# Patient Record
Sex: FEMALE | Race: WHITE | NOT HISPANIC OR LATINO | Employment: FULL TIME | URBAN - METROPOLITAN AREA
[De-identification: names, ages, dates, MRNs, and addresses within clinical notes are randomized per-mention and may not be internally consistent; named-entity substitution may affect disease eponyms.]

---

## 2017-01-03 ENCOUNTER — HOSPITAL ENCOUNTER (OUTPATIENT)
Dept: LAB | Facility: MEDICAL CENTER | Age: 47
End: 2017-01-03
Attending: STUDENT IN AN ORGANIZED HEALTH CARE EDUCATION/TRAINING PROGRAM
Payer: COMMERCIAL

## 2017-01-03 DIAGNOSIS — E03.9 HYPOTHYROIDISM (ACQUIRED): ICD-10-CM

## 2017-01-03 LAB
T4 FREE SERPL-MCNC: 0.87 NG/DL (ref 0.53–1.43)
TSH SERPL DL<=0.005 MIU/L-ACNC: 0.77 UIU/ML (ref 0.3–3.7)

## 2017-01-03 PROCEDURE — 36415 COLL VENOUS BLD VENIPUNCTURE: CPT

## 2017-01-03 PROCEDURE — 84443 ASSAY THYROID STIM HORMONE: CPT

## 2017-01-03 PROCEDURE — 84439 ASSAY OF FREE THYROXINE: CPT

## 2017-01-04 ENCOUNTER — OFFICE VISIT (OUTPATIENT)
Dept: INTERNAL MEDICINE | Facility: MEDICAL CENTER | Age: 47
End: 2017-01-04
Payer: COMMERCIAL

## 2017-01-04 VITALS
TEMPERATURE: 97.3 F | RESPIRATION RATE: 16 BRPM | DIASTOLIC BLOOD PRESSURE: 66 MMHG | SYSTOLIC BLOOD PRESSURE: 102 MMHG | BODY MASS INDEX: 29.66 KG/M2 | OXYGEN SATURATION: 97 % | HEART RATE: 78 BPM | HEIGHT: 63 IN | WEIGHT: 167.38 LBS

## 2017-01-04 DIAGNOSIS — K21.9 GERD WITHOUT ESOPHAGITIS: ICD-10-CM

## 2017-01-04 DIAGNOSIS — E03.9 HYPOTHYROIDISM (ACQUIRED): ICD-10-CM

## 2017-01-04 DIAGNOSIS — E55.9 VITAMIN D DEFICIENCY: ICD-10-CM

## 2017-01-04 PROCEDURE — 99213 OFFICE O/P EST LOW 20 MIN: CPT | Mod: GE | Performed by: INTERNAL MEDICINE

## 2017-01-04 RX ORDER — RANITIDINE 150 MG/1
150 TABLET ORAL 2 TIMES DAILY
Qty: 60 TAB | Refills: 3 | Status: SHIPPED | OUTPATIENT
Start: 2017-01-04 | End: 2017-01-04

## 2017-01-04 RX ORDER — PANTOPRAZOLE SODIUM 20 MG/1
20 TABLET, DELAYED RELEASE ORAL DAILY
Qty: 60 TAB | Refills: 2 | Status: SHIPPED | OUTPATIENT
Start: 2017-01-04 | End: 2017-03-16 | Stop reason: SDUPTHER

## 2017-01-04 RX ORDER — ERGOCALCIFEROL 1.25 MG/1
50000 CAPSULE ORAL
Qty: 12 CAP | Refills: 0 | Status: SHIPPED | OUTPATIENT
Start: 2017-01-04 | End: 2018-02-07

## 2017-01-04 NOTE — MR AVS SNAPSHOT
"        Shelby De Paz   2017 3:00 PM   Office Visit   MRN: [id number]    Department:  [facility] Med - Internal Med   Dept Phone:  [phone]    Description:  Female : 1970   Provider:  Wayne Arroyo M.D.           Reason for Visit     Labs Only Results      Allergies as of 2017     Allergen Noted Reactions    Contrast Media With Iodine [Iodine] 2016       itches      You were diagnosed with     GERD without esophagitis   [398608]       Hypothyroidism (acquired)   [238765]       Vitamin D deficiency   [0967367]         Vital Signs     Blood Pressure Pulse Temperature Respirations Height Weight    102/66 mmHg 78 36.3 °C (97.3 °F) 16 1.588 m (5' 2.52\") 75.921 kg (167 lb 6 oz)    Body Mass Index Oxygen Saturation Smoking Status             30.11 kg/m2 97% Former Smoker         Basic Information     Date Of Birth Sex Race Ethnicity Preferred Language    1970 Female White Non- English      Your appointments     Mar 16, 2017 11:00 AM   Established Patient with Tabitha Phoenix M.D.   [facility] - Internal Medicine (--)    [address] [place] [id number] [phone]           You will be receiving a confirmation call a few days before your appointment from our automated call confirmation system.              Problem List              ICD-10-CM Priority Class Noted - Resolved    Female incontinence R32   2016 - Present    GERD without esophagitis K21.9   2016 - Present    Primary insomnia F51.01   2016 - Present    Hyperlipidemia, unspecified E78.5   2016 - Present    Vitamin D deficiency E55.9   2016 - Present    Dependence on nicotine from cigarettes F17.210   2016 - Present    Mixed incontinence urge and stress N39.46   2016 - Present    Hypothyroidism (acquired) E03.9   2016 - Present    Healthcare maintenance Z00.00   2016 - Present      Health Maintenance        Date Due Completion Dates "    IMM DTaP/Tdap/Td Vaccine (2 - Tdap) 8/21/1991 1/11/1977    IMM PNEUMOCOCCAL 19-64 (ADULT) MEDIUM RISK SERIES (1 of 1 - PPSV23) 8/21/1991 ---    PAP SMEAR 8/21/1993 ---    MAMMOGRAM 8/21/2012 ---    IMM INFLUENZA (1) 9/1/2016 12/14/2011            Current Immunizations     Dtap Vaccine 1/11/1977    Influenza Vaccine Quad Inj (Pf) 12/14/2011    OPV - Historical Data 1/11/1977      Below and/or attached are the medications your provider expects you to take. Review all of your home medications and newly ordered medications with your provider and/or pharmacist. Follow medication instructions as directed by your provider and/or pharmacist. Please keep your medication list with you and share with your provider. Update the information when medications are discontinued, doses are changed, or new medications (including over-the-counter products) are added; and carry medication information at all times in the event of emergency situations     Allergies:  CONTRAST MEDIA WITH IODINE - (reactions not documented)               Medications  Valid as of: January 04, 2017 -  3:38 PM    Generic Name Brand Name Tablet Size Instructions for use    Acetaminophen (Tab) TYLENOL 500 MG Take 500-1,000 mg by mouth every 6 hours as needed.        Ergocalciferol (Cap) DRISDOL 33892 UNITS Take 1 Cap by mouth every 7 days.        Levothyroxine Sodium (Tab) SYNTHROID 150 MCG Take 1 Tab by mouth Every morning on an empty stomach.        Pantoprazole Sodium (Tablet Delayed Response) PROTONIX 20 MG Take 1 Tab by mouth every day.        Simvastatin (Tab) ZOCOR 40 MG Take 40 mg by mouth every evening.        .                 Medicines prescribed today were sent to:     [id number] - [place] - [address]    [address] [place] [address]    Phone: [phone] Fax: [phone]    Open 24 Hours?: No      Medication refill instructions:       If your prescription bottle indicates you have medication refills left, it is not  necessary to call your provider’s office. Please contact your pharmacy and they will refill your medication.    If your prescription bottle indicates you do not have any refills left, you may request refills at any time through one of the following ways: The online [facility] system (except Urgent Care), by calling your provider’s office, or by asking your pharmacy to contact your provider’s office with a refill request. Medication refills are processed only during regular business hours and may not be available until the next business day. Your provider may request additional information or to have a follow-up visit with you prior to refilling your medication.   *Please Note: Medication refills are assigned a new Rx number when refilled electronically. Your pharmacy may indicate that no refills were authorized even though a new prescription for the same medication is available at the pharmacy. Please request the medicine by name with the pharmacy before contacting your provider for a refill.        Instructions    Return in about 10 weeks (around 3/15/2017).  Increase PPI to 2x daily. If not improvement after 2 weeks, go ahead and scheduled consultation with GI consultants.   If symptoms are stable and well controlled after increasing PPI, then continue BID therapy for 10 weeks (Until next follow up).     Food Choices for Gastroesophageal Reflux Disease, Adult  When you have gastroesophageal reflux disease (GERD), the foods you eat and your eating habits are very important. Choosing the right foods can help ease the discomfort of GERD.  WHAT GENERAL GUIDELINES DO I NEED TO FOLLOW?  · Choose fruits, vegetables, whole grains, low-fat dairy products, and low-fat meat, fish, and poultry.  · Limit fats such as oils, salad dressings, butter, nuts, and avocado.  · Keep a food diary to identify foods that cause symptoms.  · Avoid foods that cause reflux. These may be different for different people.  · Eat frequent small meals  instead of three large meals each day.  · Eat your meals slowly, in a relaxed setting.  · Limit fried foods.  · Cook foods using methods other than frying.  · Avoid drinking alcohol.  · Avoid drinking large amounts of liquids with your meals.  · Avoid bending over or lying down until 2-3 hours after eating.  WHAT FOODS ARE NOT RECOMMENDED?  The following are some foods and drinks that may worsen your symptoms:  Vegetables  Tomatoes. Tomato juice. Tomato and spaghetti sauce. Chili peppers. Onion and garlic. Horseradish.  Fruits  Oranges, grapefruit, and lemon (fruit and juice).  Meats  High-fat meats, fish, and poultry. This includes hot dogs, ribs, ham, sausage, salami, and brownlee.  Dairy  Whole milk and chocolate milk. Sour cream. Cream. Butter. Ice cream. Cream cheese.   Beverages  Coffee and tea, with or without caffeine. Carbonated beverages or energy drinks.  Condiments  Hot sauce. Barbecue sauce.   Sweets/Desserts  Chocolate and cocoa. Donuts. Peppermint and spearmint.  Fats and Oils  High-fat foods, including French fries and potato chips.  Other  Vinegar. Strong spices, such as black pepper, white pepper, red pepper, cayenne, wilkinson powder, cloves, ginger, and chili powder.  The items listed above may not be a complete list of foods and beverages to avoid. Contact your dietitian for more information.     This information is not intended to replace advice given to you by your health care provider. Make sure you discuss any questions you have with your health care provider.     Document Released: 12/18/2006 Document Revised: 01/08/2016 Document Reviewed: 10/22/2014  [employer] Interactive Patient Education ©2016 Elsevier Inc.            [employer] Access Code: Activation code not generated  Current [employer] Status: Active

## 2017-01-04 NOTE — PROGRESS NOTES
Established Patient    Shelby presents today with the following:    CC:   Chief Complaint   Patient presents with   • Labs Only     Results       HPI:  Ms. De Paz is a 46-year-old female with past medical history significant for hypothyroidism and GERD is here for a follow up visit.      Acute Issues:   1. Hypothyroidism -  Patient's thyroid replacement was changed from 175 mcg to 150mcg at the last visit. Repeat thyroid labs were completed yesterday showing TSH and free T4 well within normal limits. Her weight has since stabilized. Denies any significant changes in energy levels or concentration, denies constipation or diarrhea, denies hot or cold intolerance.    2. Lower extremity swelling  Review of labs shows normal kidney and liver function. The patient denies any dyspnea on exertion, orthopnea, PND. Her weight has not significantly changed since her last appointment (167lb). She has no significant edema on exam today but reports having some lower extremity swelling at the end of the day. She was advised to use compression stockings at the last visit but has yet to use them.      3. GERD  Patient was recently seen in urgent care for abdominal pain which has since resolved. Ultrasound and lab findings from that visit were reviewed showing unremarkable abdominal ultrasound and normal CBC, amylase, lipase and CMP. The pain that prompted urgent care visit since resolved but she continues to have heartburn. Her heartburn is triggered by several foods including tomato sauces, spicy foods and even water. She reports occasional vomiting but denies weight loss, odynophagia, dysphagia, lymphadenopathy, changes in the color of stools (recent CBC showed normal hemoglobin). She is currently taking pantoprazole 20 mg daily. Her symptoms are well-controlled with once a day PPI until about a month ago.      Patient Active Problem List    Diagnosis Date Noted   • GERD without esophagitis 11/22/2016   • Primary insomnia  "11/22/2016   • Hyperlipidemia, unspecified 11/22/2016   • Vitamin D deficiency 11/22/2016   • Dependence on nicotine from cigarettes 11/22/2016   • Mixed incontinence urge and stress 11/22/2016   • Hypothyroidism (acquired) 11/22/2016   • Healthcare maintenance 11/22/2016   • Female incontinence 08/19/2016       Current Outpatient Prescriptions   Medication Sig Dispense Refill   • vitamin D, Ergocalciferol, (DRISDOL) 98581 UNITS Cap capsule Take 1 Cap by mouth every 7 days. 12 Cap 0   • pantoprazole (PROTONIX) 20 MG tablet Take 1 Tab by mouth every day. 60 Tab 2   • levothyroxine (SYNTHROID) 150 MCG Tab Take 1 Tab by mouth Every morning on an empty stomach. 30 Tab 2   • acetaminophen (TYLENOL) 500 MG Tab Take 500-1,000 mg by mouth every 6 hours as needed.     • simvastatin (ZOCOR) 40 MG Tab Take 40 mg by mouth every evening.       No current facility-administered medications for this visit.       Allergies:  Allergies   Allergen Reactions   • Contrast Media With Iodine [Iodine]      itches       ROS  Constitutional: Denies fevers or chills  Ears/Nose/Throat/Mouth: Denies nasal congestion or sore throat   Cardiovascular: Denies chest pain or palpitations   Respiratory: Denies shortness of breath, Denies cough  Gastrointestinal/Hepatic: As per history of present illness  Genitourinary: Denies dysuria or frequency  Musculoskeletal/Rheum: Denies joint pain and swelling   Neurological: Denies headache  Psychiatric: Denies mood disorder   Endocrine: History of low thyroid  Heme/Oncology/Lymph Nodes: Denies weight changes or enlarged LNs.    /66 mmHg  Pulse 78  Temp(Src) 36.3 °C (97.3 °F)  Resp 16  Ht 1.588 m (5' 2.52\")  Wt 75.921 kg (167 lb 6 oz)  BMI 30.11 kg/m2  SpO2 97%    Physical Exam  General: non-toxic apeparnce. NAD.   HEENT: EOMI. Scleral clear. No cervical lymphadenopathy  CVS: normal S1, S2. NSR. No m/r/g. No JVD.   PULM: CTABL . No w/r/r. No basilar crackles.   ABD: soft, NT, ND. +BS.   : No " suprapubic tenderness. No CVA tenderness.   EXT: no LE edema b/l. No cyanosis.  Neuro: No focal deficits.   Pysch: AAOx4  Skin: no rashes.     Assessment and Plan  1. GERD without esophagitis  Patient has symptoms of dyspepsia with no real red flag symptoms at this time that prompted emergent EGD (does have intermittent vomiting).  Will increase pantoprazole to twice a day and treat empirically for functional GERD.   She was educated on avoiding NSAIDs, smoking, alcohol and triggering foods (avoid coffee, tea, chocolates, spicy foods, acidic foods, etc.)  Advised to sit upright for at least 2 hours after each meal and no food at least 2 hours before bed.  If the patient's symptoms get worse after 2 weeks of twice a day PPI, she was advised to make an appointment with GI (already has a referral).  - pantoprazole (PROTONIX) 20 MG tablet; Take 1 Tab by mouth every day.  Dispense: 60 Tab; Refill: 2    2. Hypothyroidism (acquired)  TSH and free T4 within normal limits. Patient with no symptoms of hyper or hypothyroidism at present.  Continue levothyroxine at 150 mcg daily.     3. Vitamin D deficiency  Vitamin D 22. Provided by mouth weekly replacement ×3 months.  - vitamin D, Ergocalciferol, (DRISDOL) 09656 UNITS Cap capsule; Take 1 Cap by mouth every 7 days.  Dispense: 12 Cap; Refill: 0    Follow-up:Return in about 10 weeks (around 3/15/2017).    Signed by: Wayne Arroyo M.D.

## 2017-01-04 NOTE — PATIENT INSTRUCTIONS
Return in about 10 weeks (around 3/15/2017).  Increase PPI to 2x daily. If not improvement after 2 weeks, go ahead and scheduled consultation with GI consultants.   If symptoms are stable and well controlled after increasing PPI, then continue BID therapy for 10 weeks (Until next follow up).     Food Choices for Gastroesophageal Reflux Disease, Adult  When you have gastroesophageal reflux disease (GERD), the foods you eat and your eating habits are very important. Choosing the right foods can help ease the discomfort of GERD.  WHAT GENERAL GUIDELINES DO I NEED TO FOLLOW?  · Choose fruits, vegetables, whole grains, low-fat dairy products, and low-fat meat, fish, and poultry.  · Limit fats such as oils, salad dressings, butter, nuts, and avocado.  · Keep a food diary to identify foods that cause symptoms.  · Avoid foods that cause reflux. These may be different for different people.  · Eat frequent small meals instead of three large meals each day.  · Eat your meals slowly, in a relaxed setting.  · Limit fried foods.  · Cook foods using methods other than frying.  · Avoid drinking alcohol.  · Avoid drinking large amounts of liquids with your meals.  · Avoid bending over or lying down until 2-3 hours after eating.  WHAT FOODS ARE NOT RECOMMENDED?  The following are some foods and drinks that may worsen your symptoms:  Vegetables  Tomatoes. Tomato juice. Tomato and spaghetti sauce. Chili peppers. Onion and garlic. Horseradish.  Fruits  Oranges, grapefruit, and lemon (fruit and juice).  Meats  High-fat meats, fish, and poultry. This includes hot dogs, ribs, ham, sausage, salami, and brownlee.  Dairy  Whole milk and chocolate milk. Sour cream. Cream. Butter. Ice cream. Cream cheese.   Beverages  Coffee and tea, with or without caffeine. Carbonated beverages or energy drinks.  Condiments  Hot sauce. Barbecue sauce.   Sweets/Desserts  Chocolate and cocoa. Donuts. Peppermint and spearmint.  Fats and Oils  High-fat foods,  including French fries and potato chips.  Other  Vinegar. Strong spices, such as black pepper, white pepper, red pepper, cayenne, wilkinson powder, cloves, ginger, and chili powder.  The items listed above may not be a complete list of foods and beverages to avoid. Contact your dietitian for more information.     This information is not intended to replace advice given to you by your health care provider. Make sure you discuss any questions you have with your health care provider.     Document Released: 12/18/2006 Document Revised: 01/08/2016 Document Reviewed: 10/22/2014  [employer] Interactive Patient Education ©2016 Elsevier Inc.

## 2017-03-14 ENCOUNTER — TELEPHONE (OUTPATIENT)
Dept: INTERNAL MEDICINE | Facility: MEDICAL CENTER | Age: 47
End: 2017-03-14

## 2017-03-14 NOTE — TELEPHONE ENCOUNTER
----- Message from Kierra Pantoja sent at 3/13/2017  1:45 PM PDT -----  Regarding: Dr Phoenix/labs for thyroid  Contact: [phone]  Patient is seeing Dr Arroyo this Thursday for follow up on her thyroid medication she would like to know if she needs lab drawn. I saw no orders in her chart patient goes to [facility] if we can call her once lab orders are put in.

## 2017-03-16 ENCOUNTER — OFFICE VISIT (OUTPATIENT)
Dept: INTERNAL MEDICINE | Facility: MEDICAL CENTER | Age: 47
End: 2017-03-16
Payer: COMMERCIAL

## 2017-03-16 ENCOUNTER — APPOINTMENT (OUTPATIENT)
Dept: INTERNAL MEDICINE | Facility: MEDICAL CENTER | Age: 47
End: 2017-03-16
Payer: COMMERCIAL

## 2017-03-16 VITALS
DIASTOLIC BLOOD PRESSURE: 74 MMHG | SYSTOLIC BLOOD PRESSURE: 100 MMHG | TEMPERATURE: 98.2 F | BODY MASS INDEX: 29.95 KG/M2 | WEIGHT: 169 LBS | HEART RATE: 74 BPM | HEIGHT: 63 IN | OXYGEN SATURATION: 92 %

## 2017-03-16 DIAGNOSIS — K21.9 GERD WITHOUT ESOPHAGITIS: ICD-10-CM

## 2017-03-16 DIAGNOSIS — E55.9 VITAMIN D DEFICIENCY: ICD-10-CM

## 2017-03-16 DIAGNOSIS — E03.9 HYPOTHYROIDISM (ACQUIRED): ICD-10-CM

## 2017-03-16 PROCEDURE — 99213 OFFICE O/P EST LOW 20 MIN: CPT | Mod: GE | Performed by: INTERNAL MEDICINE

## 2017-03-16 RX ORDER — PANTOPRAZOLE SODIUM 40 MG/1
40 TABLET, DELAYED RELEASE ORAL 2 TIMES DAILY
Qty: 60 TAB | Refills: 1 | Status: SHIPPED | OUTPATIENT
Start: 2017-03-16 | End: 2018-02-07

## 2017-03-16 NOTE — PROGRESS NOTES
Established Patient    Shelby presents today with the following:    CC:   Chief Complaint   Patient presents with   • Follow-Up     follow up visit       HPI:     Acute Issues:   1. Hypothyroidism -  The patient's thyroid dose was changed from 175mcg->150mcg a few months back. Since then she reports feeling slightly sluggish but otherwise denies any symptoms of hypothyroidism. She was concerned about possible weight gain however review of weight from last visit and this visit suggest no significant changes.    2. Lower extremity swelling  The patient reports that her lower extremity swelling is now resolved.    3. GERD  The patient continues to have heartburn described as burning sensation in the throat and stomach with occasional vomiting. She denies hematemesis, odynophagia, dysphagia, dark-colored stools, bright red blood per rectum or any significant changes in weight. Of note: review of old note from 2015 shows a weight of 150, whereas today the patient weighs 169lbs. She was placed on pantoprazole a few months back which has slowly been tapered to 20 mg twice a day. On this dose the patient reports that her symptoms are better controlled but continue to occur almost daily. She rarely uses alcohol, never smoker and rarely uses NSAIDs.      Patient Active Problem List    Diagnosis Date Noted   • GERD without esophagitis 11/22/2016   • Primary insomnia 11/22/2016   • Hyperlipidemia, unspecified 11/22/2016   • Vitamin D deficiency 11/22/2016   • Dependence on nicotine from cigarettes 11/22/2016   • Mixed incontinence urge and stress 11/22/2016   • Hypothyroidism (acquired) 11/22/2016   • Healthcare maintenance 11/22/2016   • Female incontinence 08/19/2016       Current Outpatient Prescriptions   Medication Sig Dispense Refill   • pantoprazole (PROTONIX) 40 MG Tablet Delayed Response Take 1 Tab by mouth 2 times a day. 60 Tab 1   • vitamin D, Ergocalciferol, (DRISDOL) 02254 UNITS Cap capsule Take 1 Cap by mouth  "every 7 days. 12 Cap 0   • simvastatin (ZOCOR) 40 MG Tab Take 40 mg by mouth every evening.     • levothyroxine (SYNTHROID) 150 MCG Tab Take 1 Tab by mouth Every morning on an empty stomach. 30 Tab 2   • acetaminophen (TYLENOL) 500 MG Tab Take 500-1,000 mg by mouth every 6 hours as needed.       No current facility-administered medications for this visit.       Allergies:  Allergies   Allergen Reactions   • Contrast Media With Iodine [Iodine]      itches       ROS  Constitutional: Denies fevers or chills  Ears/Nose/Throat/Mouth: Denies nasal congestion or sore throat   Cardiovascular: Denies chest pain or palpitations   Respiratory: Denies shortness of breath, Denies cough  Gastrointestinal/Hepatic: As discussed above  Neurological: Denies headache  Endocrine: History of low thyroid  Heme/Oncology/Lymph Nodes: Denies weight changes or enlarged LNs.    /74 mmHg  Pulse 74  Temp(Src) 36.8 °C (98.2 °F)  Ht 1.588 m (5' 2.52\")  Wt 76.658 kg (169 lb)  BMI 30.40 kg/m2  SpO2 92%    Physical Exam  General: non-toxic apeparnce. NAD.   HEENT: EOMI. Scleral clear.  CVS: normal S1, S2. NSR. No m/r/g.   PULM: CTABL . No w/r/r. No basilar crackles.   ABD: soft, NT, ND. +BS.   EXT: no LE edema b/l. No cyanosis.  Neuro: No focal deficits.   Pysch: AAOx4  Skin: no rashes.     Assessment and Plan  1. Vitamin D deficiency  The patient is currently taking vitamin replacement weekly and is nearing completion of therapy.  Follow-up repeat vitamin D to ensure adequate levels and need for continued weekly replacement therapy.  - VITAMIN D,25 HYDROXY; Future    2. Hypothyroidism (acquired)  Patient's levothyroxine was decreased from 175->150mcg. She denies any symptoms of hypothyroidism.  We'll repeat labs to ensure appropriate thyroid function after dosage change.  - TSH WITH REFLEX TO FT4; Future    3. GERD without esophagitis  The patient has been dealing with dyspepsia for some time now. She was started on pantoprazole which " was slowly increased to 20 mg twice a day, which markedly improved her symptoms but has failed to resolve them. She reports having almost daily symptoms albeit at a much lower intensity since starting the pantoprazole. She denies any weight loss, GI bleed, odynophagia, or dysphagia. She has no risk factors aside from 20 pound weight gain in the past 2 years (rarely uses alcohol or NSAIDs, and is a never smoker) However she reports continued symptoms with vomiting on twice a day PPI --which would warrant consultation with GI for possible EGD (to rule out underlying metaplasia or alternate etiology).   - Increase the dose of PPI to 40 mg twice a day for now  - Referral placed for GI consultants  Despite being on pantoprazole 20 mg twice a day, the patient continues to have symptoms of dyspepsia described as  - pantoprazole (PROTONIX) 40 MG Tablet Delayed Response; Take 1 Tab by mouth 2 times a day.  Dispense: 60 Tab; Refill: 1  - REFERRAL TO GASTROENTEROLOGY    Follow-up:Return in about 10 weeks (around 5/25/2017).    Signed by: Wayne Arroyo M.D.

## 2017-03-16 NOTE — PATIENT INSTRUCTIONS
Return in about 10 weeks (around 5/25/2017).  Increase pantoprazole to 40mg BID  Referral placed for GI consultants.   Please have requested labs completed.

## 2017-03-16 NOTE — MR AVS SNAPSHOT
"Shelby De Paz   3/16/2017 2:30 PM   Office Visit   MRN: [id number]    Department:  [facility]   Dept Phone:  [phone]    Description:  Female : 1970   Provider:  Wayne Arroyo M.D.           Reason for Visit     Follow-Up follow up visit      Allergies as of 3/16/2017     Allergen Noted Reactions    Contrast Media With Iodine [Iodine] 2016       itches      You were diagnosed with     Vitamin D deficiency   [1081996]       Hypothyroidism (acquired)   [652838]       GERD without esophagitis   [803038]         Vital Signs     Blood Pressure Pulse Temperature Height Weight Body Mass Index    100/74 mmHg 74 36.8 °C (98.2 °F) 1.588 m (5' 2.52\") 76.658 kg (169 lb) 30.40 kg/m2    Oxygen Saturation Smoking Status                92% Former Smoker          Basic Information     Date Of Birth Sex Race Ethnicity Preferred Language    1970 Female White Non- English      Problem List              ICD-10-CM Priority Class Noted - Resolved    Female incontinence R32   2016 - Present    GERD without esophagitis K21.9   2016 - Present    Primary insomnia F51.01   2016 - Present    Hyperlipidemia, unspecified E78.5   2016 - Present    Vitamin D deficiency E55.9   2016 - Present    Dependence on nicotine from cigarettes F17.210   2016 - Present    Mixed incontinence urge and stress N39.46   2016 - Present    Hypothyroidism (acquired) E03.9   2016 - Present    Healthcare maintenance Z00.00   2016 - Present      Health Maintenance        Date Due Completion Dates    IMM DTaP/Tdap/Td Vaccine (2 - Tdap) 1991    IMM PNEUMOCOCCAL 19-64 (ADULT) MEDIUM RISK SERIES (1 of 1 - PPSV23) 1991 ---    PAP SMEAR 1993 ---    MAMMOGRAM 2012 ---    IMM INFLUENZA (1) 2011            Current Immunizations     Dtap Vaccine 1977    Influenza Vaccine Quad Inj (Pf) 2011    OPV - Historical Data " 1/11/1977      Below and/or attached are the medications your provider expects you to take. Review all of your home medications and newly ordered medications with your provider and/or pharmacist. Follow medication instructions as directed by your provider and/or pharmacist. Please keep your medication list with you and share with your provider. Update the information when medications are discontinued, doses are changed, or new medications (including over-the-counter products) are added; and carry medication information at all times in the event of emergency situations     Allergies:  CONTRAST MEDIA WITH IODINE - (reactions not documented)               Medications  Valid as of: March 16, 2017 -  3:19 PM    Generic Name Brand Name Tablet Size Instructions for use    Acetaminophen (Tab) TYLENOL 500 MG Take 500-1,000 mg by mouth every 6 hours as needed.        Ergocalciferol (Cap) DRISDOL 55536 UNITS Take 1 Cap by mouth every 7 days.        Levothyroxine Sodium (Tab) SYNTHROID 150 MCG Take 1 Tab by mouth Every morning on an empty stomach.        Pantoprazole Sodium (Tablet Delayed Response) PROTONIX 40 MG Take 1 Tab by mouth 2 times a day.        Simvastatin (Tab) ZOCOR 40 MG Take 40 mg by mouth every evening.        .                 Medicines prescribed today were sent to:     [id number] - [place], [place] - [address]    [address] [facility]. [place] [address]    Phone: [phone] Fax: [phone]    Open 24 Hours?: No      Medication refill instructions:       If your prescription bottle indicates you have medication refills left, it is not necessary to call your provider’s office. Please contact your pharmacy and they will refill your medication.    If your prescription bottle indicates you do not have any refills left, you may request refills at any time through one of the following ways: The online [employer] system (except Urgent Care), by calling your provider’s office, or by asking your pharmacy to contact  your provider’s office with a refill request. Medication refills are processed only during regular business hours and may not be available until the next business day. Your provider may request additional information or to have a follow-up visit with you prior to refilling your medication.   *Please Note: Medication refills are assigned a new Rx number when refilled electronically. Your pharmacy may indicate that no refills were authorized even though a new prescription for the same medication is available at the pharmacy. Please request the medicine by name with the pharmacy before contacting your provider for a refill.        Your To Do List     Future Labs/Procedures Complete By Expires    TSH WITH REFLEX TO FT4  As directed 3/16/2018    VITAMIN D,25 HYDROXY  As directed 3/16/2018      Referral     A referral request has been sent to our patient care coordination department. Please allow 3-5 business days for us to process this request and contact you either by phone or mail. If you do not hear from us by the 5th business day, please call us at [phone].        Instructions    Return in about 10 weeks (around 5/25/2017).  Increase pantoprazole to 40mg BID  Referral placed for GI consultants.   Please have requested labs completed.           [employer] Access Code: Activation code not generated  Current [employer] Status: Active

## 2017-04-25 DIAGNOSIS — E03.9 HYPOTHYROIDISM (ACQUIRED): ICD-10-CM

## 2017-04-25 RX ORDER — LEVOTHYROXINE SODIUM 0.15 MG/1
150 TABLET ORAL
Qty: 90 TAB | Refills: 2 | Status: SHIPPED | OUTPATIENT
Start: 2017-04-25 | End: 2018-02-07 | Stop reason: SDUPTHER

## 2017-04-25 NOTE — TELEPHONE ENCOUNTER
Last seen: 03/16/17 by Dr. Phoenix  Next appt: None     Was the patient seen in the last year in this department? Yes   Does patient have an active prescription for medications requested? No   Received Request Via: Pharmacy

## 2017-09-18 ENCOUNTER — OFFICE VISIT (OUTPATIENT)
Dept: URGENT CARE | Facility: PHYSICIAN GROUP | Age: 47
End: 2017-09-18
Payer: COMMERCIAL

## 2017-09-18 ENCOUNTER — HOSPITAL ENCOUNTER (OUTPATIENT)
Dept: RADIOLOGY | Facility: MEDICAL CENTER | Age: 47
End: 2017-09-18
Attending: FAMILY MEDICINE
Payer: COMMERCIAL

## 2017-09-18 VITALS
BODY MASS INDEX: 30.83 KG/M2 | HEIGHT: 63 IN | HEART RATE: 100 BPM | OXYGEN SATURATION: 96 % | SYSTOLIC BLOOD PRESSURE: 106 MMHG | DIASTOLIC BLOOD PRESSURE: 68 MMHG | WEIGHT: 174 LBS | TEMPERATURE: 98 F | RESPIRATION RATE: 16 BRPM

## 2017-09-18 DIAGNOSIS — R05.9 COUGH: ICD-10-CM

## 2017-09-18 DIAGNOSIS — R06.2 WHEEZE: ICD-10-CM

## 2017-09-18 DIAGNOSIS — R05.2 SUBACUTE COUGH: ICD-10-CM

## 2017-09-18 PROCEDURE — 94760 N-INVAS EAR/PLS OXIMETRY 1: CPT | Performed by: FAMILY MEDICINE

## 2017-09-18 PROCEDURE — 99214 OFFICE O/P EST MOD 30 MIN: CPT | Performed by: FAMILY MEDICINE

## 2017-09-18 PROCEDURE — 71020 DX-CHEST-2 VIEWS: CPT

## 2017-09-18 RX ORDER — ALBUTEROL SULFATE 90 UG/1
2 AEROSOL, METERED RESPIRATORY (INHALATION) EVERY 4 HOURS PRN
Qty: 1 INHALER | Refills: 0 | Status: ON HOLD | OUTPATIENT
Start: 2017-09-18 | End: 2017-10-23

## 2017-09-18 RX ORDER — BENZONATATE 200 MG/1
200 CAPSULE ORAL 3 TIMES DAILY PRN
Qty: 30 CAP | Refills: 0 | Status: SHIPPED | OUTPATIENT
Start: 2017-09-18 | End: 2017-10-16

## 2017-09-18 RX ORDER — AZITHROMYCIN 250 MG/1
TABLET, FILM COATED ORAL
Qty: 6 TAB | Refills: 0 | Status: ON HOLD | OUTPATIENT
Start: 2017-09-18 | End: 2017-10-23

## 2017-09-18 ASSESSMENT — ENCOUNTER SYMPTOMS
WEIGHT LOSS: 0
MYALGIAS: 1
EYE DISCHARGE: 0
HEADACHES: 1
EYE REDNESS: 0

## 2017-09-18 NOTE — PROGRESS NOTES
"Subjective:      Shelby De Paz is a 47 y.o. female who presents with Cough (x 1 month with congestion x 2 days )            1 month initially dry cough that has become productive x 2 days. No blood in sputum. +chills. +SOB/wheeze. +PMH childhood asthma. No PMH pneumonia. Mild rhinorrhea. Mild ST. No OTC medications. No other aggravating or alleviating factors.            Review of Systems   Constitutional: Positive for malaise/fatigue. Negative for weight loss.   Eyes: Negative for discharge and redness.   Cardiovascular: Negative for leg swelling.   Musculoskeletal: Positive for myalgias. Negative for joint pain.   Skin: Negative for itching and rash.   Neurological: Positive for headaches (mild).     .  Medications, Allergies, and current problem list reviewed today in Epic       Objective:     /68   Pulse 100   Temp 36.7 °C (98 °F)   Resp 16   Ht 1.588 m (5' 2.5\")   Wt 78.9 kg (174 lb)   SpO2 96%   BMI 31.32 kg/m²      Physical Exam   Constitutional: She appears well-developed and well-nourished. No distress.   HENT:   Head: Normocephalic and atraumatic.   Right Ear: External ear normal.   Left Ear: External ear normal.   Mouth/Throat: Oropharynx is clear and moist.   Eyes: Conjunctivae are normal.   Neck: Neck supple.   Cardiovascular: Regular rhythm and normal heart sounds.    rapid   Pulmonary/Chest: Effort normal. She has wheezes (and rhonchi throughout).   Lymphadenopathy:     She has no cervical adenopathy.   Neurological:   Speech is clear. Patient is appropriate and cooperative.     Skin: Skin is warm and dry. No rash noted.               Assessment/Plan:     Pulse ox adequate  CXR: no acute cardiopulmonary process by my read, radiology pending.    1. Wheeze  DX-CHEST-2 VIEWS    albuterol 108 (90 Base) MCG/ACT Aero Soln inhalation aerosol   2. Subacute cough  azithromycin (ZITHROMAX) 250 MG Tab    benzonatate (TESSALON) 200 MG capsule     Differential diagnosis, natural history, " supportive care, and indications for immediate follow-up discussed at length.

## 2017-10-16 ENCOUNTER — HOSPITAL ENCOUNTER (OUTPATIENT)
Dept: RADIOLOGY | Facility: MEDICAL CENTER | Age: 47
End: 2017-10-16
Attending: SURGERY | Admitting: SURGERY
Payer: COMMERCIAL

## 2017-10-16 DIAGNOSIS — Z01.812 PRE-OPERATIVE LABORATORY EXAMINATION: ICD-10-CM

## 2017-10-16 DIAGNOSIS — Z01.811 PRE-OPERATIVE RESPIRATORY EXAMINATION: ICD-10-CM

## 2017-10-16 LAB
ANION GAP SERPL CALC-SCNC: 9 MMOL/L (ref 0–11.9)
BUN SERPL-MCNC: 11 MG/DL (ref 8–22)
CALCIUM SERPL-MCNC: 9.8 MG/DL (ref 8.5–10.5)
CHLORIDE SERPL-SCNC: 101 MMOL/L (ref 96–112)
CO2 SERPL-SCNC: 27 MMOL/L (ref 20–33)
CREAT SERPL-MCNC: 0.89 MG/DL (ref 0.5–1.4)
ERYTHROCYTE [DISTWIDTH] IN BLOOD BY AUTOMATED COUNT: 41.2 FL (ref 35.9–50)
GFR SERPL CREATININE-BSD FRML MDRD: >60 ML/MIN/1.73 M 2
GLUCOSE SERPL-MCNC: 91 MG/DL (ref 65–99)
HCT VFR BLD AUTO: 44 % (ref 37–47)
HGB BLD-MCNC: 14.3 G/DL (ref 12–16)
MCH RBC QN AUTO: 30.2 PG (ref 27–33)
MCHC RBC AUTO-ENTMCNC: 32.5 G/DL (ref 33.6–35)
MCV RBC AUTO: 93 FL (ref 81.4–97.8)
PLATELET # BLD AUTO: 329 K/UL (ref 164–446)
PMV BLD AUTO: 10.4 FL (ref 9–12.9)
POTASSIUM SERPL-SCNC: 4 MMOL/L (ref 3.6–5.5)
RBC # BLD AUTO: 4.73 M/UL (ref 4.2–5.4)
SODIUM SERPL-SCNC: 137 MMOL/L (ref 135–145)
WBC # BLD AUTO: 7.4 K/UL (ref 4.8–10.8)

## 2017-10-16 PROCEDURE — 85027 COMPLETE CBC AUTOMATED: CPT

## 2017-10-16 PROCEDURE — 36415 COLL VENOUS BLD VENIPUNCTURE: CPT

## 2017-10-16 PROCEDURE — 71010 DX-CHEST-LIMITED (1 VIEW): CPT

## 2017-10-16 PROCEDURE — 80048 BASIC METABOLIC PNL TOTAL CA: CPT

## 2017-10-23 ENCOUNTER — HOSPITAL ENCOUNTER (OUTPATIENT)
Facility: MEDICAL CENTER | Age: 47
End: 2017-10-24
Attending: SURGERY | Admitting: SURGERY
Payer: COMMERCIAL

## 2017-10-23 PROBLEM — K44.9 HIATAL HERNIA: Status: ACTIVE | Noted: 2017-10-23

## 2017-10-23 LAB
HCG UR QL: NEGATIVE
SP GR UR REFRACTOMETRY: 1.02

## 2017-10-23 PROCEDURE — 502571 HCHG PACK, LAP CHOLE: Performed by: SURGERY

## 2017-10-23 PROCEDURE — 501570 HCHG TROCAR, SEPARATOR: Performed by: SURGERY

## 2017-10-23 PROCEDURE — 700101 HCHG RX REV CODE 250

## 2017-10-23 PROCEDURE — 501664 HCHG TUBING, FILTER STRYKER: Performed by: SURGERY

## 2017-10-23 PROCEDURE — 160025 RECOVERY II MINUTES (STATS): Performed by: SURGERY

## 2017-10-23 PROCEDURE — 501577 HCHG TROCAR, STEP 11MM: Performed by: SURGERY

## 2017-10-23 PROCEDURE — 700102 HCHG RX REV CODE 250 W/ 637 OVERRIDE(OP)

## 2017-10-23 PROCEDURE — 500868 HCHG NEEDLE, SURGI(VARES): Performed by: SURGERY

## 2017-10-23 PROCEDURE — G0378 HOSPITAL OBSERVATION PER HR: HCPCS

## 2017-10-23 PROCEDURE — A9270 NON-COVERED ITEM OR SERVICE: HCPCS

## 2017-10-23 PROCEDURE — 700111 HCHG RX REV CODE 636 W/ 250 OVERRIDE (IP): Performed by: PHYSICIAN ASSISTANT

## 2017-10-23 PROCEDURE — 700111 HCHG RX REV CODE 636 W/ 250 OVERRIDE (IP)

## 2017-10-23 PROCEDURE — 500522 HCHG ENDOSTITCH SUTURING DEVICE: Performed by: SURGERY

## 2017-10-23 PROCEDURE — 160029 HCHG SURGERY MINUTES - 1ST 30 MINS LEVEL 4: Performed by: SURGERY

## 2017-10-23 PROCEDURE — 501583 HCHG TROCAR, THRD CAN&SEAL 5X100: Performed by: SURGERY

## 2017-10-23 PROCEDURE — 160009 HCHG ANES TIME/MIN: Performed by: SURGERY

## 2017-10-23 PROCEDURE — 500521 HCHG ENDOSTITCH LOAD UNIT: Performed by: SURGERY

## 2017-10-23 PROCEDURE — 160041 HCHG SURGERY MINUTES - EA ADDL 1 MIN LEVEL 4: Performed by: SURGERY

## 2017-10-23 PROCEDURE — 81025 URINE PREGNANCY TEST: CPT

## 2017-10-23 PROCEDURE — 160046 HCHG PACU - 1ST 60 MINS PHASE II: Performed by: SURGERY

## 2017-10-23 PROCEDURE — 160048 HCHG OR STATISTICAL LEVEL 1-5: Performed by: SURGERY

## 2017-10-23 PROCEDURE — 501838 HCHG SUTURE GENERAL: Performed by: SURGERY

## 2017-10-23 PROCEDURE — 160035 HCHG PACU - 1ST 60 MINS PHASE I: Performed by: SURGERY

## 2017-10-23 PROCEDURE — 160002 HCHG RECOVERY MINUTES (STAT): Performed by: SURGERY

## 2017-10-23 PROCEDURE — 160036 HCHG PACU - EA ADDL 30 MINS PHASE I: Performed by: SURGERY

## 2017-10-23 PROCEDURE — 96374 THER/PROPH/DIAG INJ IV PUSH: CPT

## 2017-10-23 PROCEDURE — 160047 HCHG PACU  - EA ADDL 30 MINS PHASE II: Performed by: SURGERY

## 2017-10-23 PROCEDURE — A6402 STERILE GAUZE <= 16 SQ IN: HCPCS | Performed by: SURGERY

## 2017-10-23 RX ORDER — LIDOCAINE HYDROCHLORIDE 10 MG/ML
0.5 INJECTION, SOLUTION INFILTRATION; PERINEURAL
Status: COMPLETED | OUTPATIENT
Start: 2017-10-23 | End: 2017-10-23

## 2017-10-23 RX ORDER — CELECOXIB 200 MG/1
CAPSULE ORAL
Status: COMPLETED
Start: 2017-10-23 | End: 2017-10-23

## 2017-10-23 RX ORDER — LIDOCAINE HYDROCHLORIDE 10 MG/ML
INJECTION, SOLUTION INFILTRATION; PERINEURAL
Status: COMPLETED
Start: 2017-10-23 | End: 2017-10-23

## 2017-10-23 RX ORDER — LIDOCAINE AND PRILOCAINE 25; 25 MG/G; MG/G
1 CREAM TOPICAL
Status: COMPLETED | OUTPATIENT
Start: 2017-10-23 | End: 2017-10-23

## 2017-10-23 RX ORDER — ACETAMINOPHEN 500 MG
TABLET ORAL
Status: COMPLETED
Start: 2017-10-23 | End: 2017-10-23

## 2017-10-23 RX ORDER — GABAPENTIN 300 MG/1
CAPSULE ORAL
Status: COMPLETED
Start: 2017-10-23 | End: 2017-10-23

## 2017-10-23 RX ORDER — HYDROMORPHONE HYDROCHLORIDE 2 MG/ML
INJECTION, SOLUTION INTRAMUSCULAR; INTRAVENOUS; SUBCUTANEOUS
Status: COMPLETED
Start: 2017-10-23 | End: 2017-10-23

## 2017-10-23 RX ORDER — HYDROCODONE BITARTRATE AND ACETAMINOPHEN 2.5; 108 MG/5ML; MG/5ML
15 SOLUTION ORAL EVERY 4 HOURS PRN
Qty: 240 ML | Refills: 0 | Status: SHIPPED | OUTPATIENT
Start: 2017-10-23 | End: 2018-02-07

## 2017-10-23 RX ORDER — OXYCODONE HCL 20 MG/1
TABLET, FILM COATED, EXTENDED RELEASE ORAL
Status: COMPLETED
Start: 2017-10-23 | End: 2017-10-23

## 2017-10-23 RX ORDER — SODIUM CHLORIDE, SODIUM LACTATE, POTASSIUM CHLORIDE, CALCIUM CHLORIDE 600; 310; 30; 20 MG/100ML; MG/100ML; MG/100ML; MG/100ML
INJECTION, SOLUTION INTRAVENOUS CONTINUOUS
Status: DISCONTINUED | OUTPATIENT
Start: 2017-10-23 | End: 2017-10-24 | Stop reason: HOSPADM

## 2017-10-23 RX ORDER — OXYCODONE HCL 5 MG/5 ML
SOLUTION, ORAL ORAL
Status: COMPLETED
Start: 2017-10-23 | End: 2017-10-23

## 2017-10-23 RX ORDER — BUPIVACAINE HYDROCHLORIDE AND EPINEPHRINE 5; 5 MG/ML; UG/ML
INJECTION, SOLUTION EPIDURAL; INTRACAUDAL; PERINEURAL
Status: DISCONTINUED | OUTPATIENT
Start: 2017-10-23 | End: 2017-10-23 | Stop reason: HOSPADM

## 2017-10-23 RX ORDER — KETOROLAC TROMETHAMINE 30 MG/ML
30 INJECTION, SOLUTION INTRAMUSCULAR; INTRAVENOUS ONCE
Status: COMPLETED | OUTPATIENT
Start: 2017-10-23 | End: 2017-10-23

## 2017-10-23 RX ORDER — KETOROLAC TROMETHAMINE 30 MG/ML
INJECTION, SOLUTION INTRAMUSCULAR; INTRAVENOUS
Status: COMPLETED
Start: 2017-10-23 | End: 2017-10-23

## 2017-10-23 RX ADMIN — ACETAMINOPHEN 1000 MG: 500 TABLET, FILM COATED ORAL at 14:40

## 2017-10-23 RX ADMIN — GABAPENTIN 600 MG: 300 CAPSULE ORAL at 14:40

## 2017-10-23 RX ADMIN — KETOROLAC TROMETHAMINE 30 MG: 30 INJECTION, SOLUTION INTRAMUSCULAR at 16:20

## 2017-10-23 RX ADMIN — LIDOCAINE HYDROCHLORIDE 0.5 ML: 10 INJECTION, SOLUTION INFILTRATION; PERINEURAL at 13:45

## 2017-10-23 RX ADMIN — FENTANYL CITRATE 50 MCG: 50 INJECTION, SOLUTION INTRAMUSCULAR; INTRAVENOUS at 15:47

## 2017-10-23 RX ADMIN — HYDROMORPHONE HYDROCHLORIDE 0.5 MG: 2 INJECTION INTRAMUSCULAR; INTRAVENOUS; SUBCUTANEOUS at 16:30

## 2017-10-23 RX ADMIN — OXYCODONE HYDROCHLORIDE 10 MG: 5 SOLUTION ORAL at 16:04

## 2017-10-23 RX ADMIN — KETOROLAC TROMETHAMINE 30 MG: 30 INJECTION, SOLUTION INTRAMUSCULAR at 22:49

## 2017-10-23 RX ADMIN — SODIUM CHLORIDE, SODIUM LACTATE, POTASSIUM CHLORIDE, CALCIUM CHLORIDE: 600; 310; 30; 20 INJECTION, SOLUTION INTRAVENOUS at 13:45

## 2017-10-23 RX ADMIN — OXYCODONE HYDROCHLORIDE 20 MG: 20 TABLET, FILM COATED, EXTENDED RELEASE ORAL at 14:40

## 2017-10-23 RX ADMIN — CELECOXIB 400 MG: 200 CAPSULE ORAL at 14:40

## 2017-10-23 ASSESSMENT — PAIN SCALES - GENERAL
PAINLEVEL_OUTOF10: 0
PAINLEVEL_OUTOF10: 0
PAINLEVEL_OUTOF10: 4
PAINLEVEL_OUTOF10: 7
PAINLEVEL_OUTOF10: 2
PAINLEVEL_OUTOF10: 5

## 2017-10-23 ASSESSMENT — LIFESTYLE VARIABLES
TOTAL SCORE: 0
EVER HAD A DRINK FIRST THING IN THE MORNING TO STEADY YOUR NERVES TO GET RID OF A HANGOVER: NO
HAVE YOU EVER FELT YOU SHOULD CUT DOWN ON YOUR DRINKING: NO
ON A TYPICAL DAY WHEN YOU DRINK ALCOHOL HOW MANY DRINKS DO YOU HAVE: 6
CONSUMPTION TOTAL: NEGATIVE
AVERAGE NUMBER OF DAYS PER WEEK YOU HAVE A DRINK CONTAINING ALCOHOL: 1
HAVE PEOPLE ANNOYED YOU BY CRITICIZING YOUR DRINKING: NO
ALCOHOL_USE: YES
TOTAL SCORE: 0
EVER FELT BAD OR GUILTY ABOUT YOUR DRINKING: NO
HOW MANY TIMES IN THE PAST YEAR HAVE YOU HAD 5 OR MORE DRINKS IN A DAY: 0
EVER_SMOKED: YES
TOTAL SCORE: 0

## 2017-10-23 NOTE — OP REPORT
DATE OF SERVICE:  10/23/2017    PREOPERATIVE DIAGNOSIS:  Hiatal hernia with gastroesophageal reflux disease.    POSTOPERATIVE DIAGNOSIS:  Hiatal hernia with gastroesophageal reflux disease.    PROCEDURE:  Laparoscopic Nissen fundoplication.    SURGEON:  John H. Ganser, MD    ASSISTANT:  Kapil Magaña PA-C    ANESTHESIA:  General.    ANESTHESIOLOGIST:  Jakob Miranda MD    INDICATIONS:  The patient is a 47-year-old female with history of severe   poorly controlled gastroesophageal reflux disease.  Extensive workup has   ensued, which confirmed her to be a good candidate for Nissen fundoplication.    Risks, benefits, and alternatives to laparoscopic Nissen fundoplication were   outlined in detail.  Questions answered and she wished to proceed.    FINDINGS:  There was a sliding hiatal hernia.  Primary hiatal hernia repair   was carried out and 360-degree fundoplication performed over a 54-Upper sorbian   bougie.    DESCRIPTION OF PROCEDURE:  Patient was identified and general anesthetic   administered.  Her abdomen was prepped and draped in the usual sterile   fashion.  Local anesthesia 0.5% Marcaine with epinephrine injected prior to   making skin incision.  Small incision was made in the superior aspect of the   umbilicus and the Veress needle passed.  The abdomen was insufflated with   carbon dioxide without incident and a 5 mm blunt trocar and 5 mm 30-degree   scope inserted.  Mike liver retractor was passed through a small   subxiphoid incision, used to elevate the lateral segment of liver and held   with the robotic arm.  An epigastric 5 mm left upper quadrant and 11 mm left   lateral subcostal 5 mm trocars were placed.  Inspection of the hiatus showed a   small sliding hiatal hernia.  Dissection was begun by dividing the   gastrohepatic ligament using the Harmonic scalpel, which was used for all of   the dissection.  The hernia sac was mobilized from the hiatus   circumferentially and circumferential  dissection carried around the esophagus   well into the mediastinum allowing the gastroesophageal junction to be brought   well below the hiatus under no tension upwards.  Care was taken to identify   and avoid injury to the vagus nerves.  Short gastric vessels were taken down   superior aspect of the fundus to allow for fundoplication.    Posterior hiatal hernia repair was carried out with 0 Surgidac using the   Endostitch device closing down the hiatus to an appropriate size.    Fundoplication was carried out rotating the fundus from left to right behind   the esophagus.  Right and left posterolateral tacking sutures were placed to   help prevent any posterior slippage of the wrap through the hiatus.  A   54-Irish bougie was then passed by anesthesia and the wrap brought up on both   sides and tacked down to the esophagus with 3 sutures using the Endostitch   device completing about a 2 cm wrap.  An appropriate wrap was confirmed being   able to lift the wrap lateral to the esophagus with the bougie in place.    Prior to removing the bougie, an anterior suture was placed securing the wrap   on the left anterolateral aspect of the hiatus.  The bougie was removed and   the wrap maintained in good orientation with no torsion or tension upwards.    The abdomen was irrigated and hemostasis assured.  Liver retractor and trocars   were withdrawn, the abdomen deflated, and incisions closed with Vicryl.    Sterile dressings were applied.  Patient returned to recovery in stable   condition.       ____________________________________     JOHN H. GANSER, MD [facility] / [facility]    DD:  10/23/2017 15:41:16  DT:  10/23/2017 15:59:38    D#:  [id number]  Job#:  [id number]    cc: PHAM EDGAR MD, TIEN GALLAGHER MD

## 2017-10-23 NOTE — OR SURGEON
Immediate Post OP Note    PreOp Diagnosis: GERD, hiatal hernia    PostOp Diagnosis: Same    Procedure(s):  NISSEN FUNDOPLICATION LAPAROSCOPIC - Wound Class: Clean    Surgeon(s):  John H Ganser, M.D.    Anesthesiologist/Type of Anesthesia:  Anesthesiologist: Jakob Miranda M.D./General    Surgical Staff:  Assistant: DONA Rodriguez  Circulator: Megan Perez R.N.  Scrub Person: Asim Andres    Specimens:  * No specimens in log *    Estimated Blood Loss: 0    Findings: 0    Complications: 0        10/23/2017 3:36 PM John H Ganser

## 2017-10-24 VITALS
RESPIRATION RATE: 18 BRPM | SYSTOLIC BLOOD PRESSURE: 99 MMHG | WEIGHT: 175.49 LBS | HEART RATE: 90 BPM | TEMPERATURE: 98.9 F | DIASTOLIC BLOOD PRESSURE: 55 MMHG | OXYGEN SATURATION: 95 % | BODY MASS INDEX: 32.29 KG/M2 | HEIGHT: 62 IN

## 2017-10-24 PROCEDURE — 700102 HCHG RX REV CODE 250 W/ 637 OVERRIDE(OP): Performed by: SURGERY

## 2017-10-24 PROCEDURE — G0378 HOSPITAL OBSERVATION PER HR: HCPCS

## 2017-10-24 PROCEDURE — A9270 NON-COVERED ITEM OR SERVICE: HCPCS | Performed by: SURGERY

## 2017-10-24 RX ADMIN — HYDROCODONE BITARTRATE AND ACETAMINOPHEN 15 ML: 7.5; 325 SOLUTION ORAL at 08:21

## 2017-10-24 ASSESSMENT — PAIN SCALES - GENERAL
PAINLEVEL_OUTOF10: 2
PAINLEVEL_OUTOF10: 6

## 2017-10-24 NOTE — CARE PLAN
Problem: Safety  Goal: Will remain free from injury    Intervention: Provide assistance with mobility  Educated pt to call for all needs      Problem: Infection  Goal: Will remain free from infection    Intervention: Assess signs and symptoms of infection  Educated pt to perform hand Hygiene after any and all activities.

## 2017-10-24 NOTE — PROGRESS NOTES
Pt discharged with  via ambulation. Prescriptions were given x 1 per pt previous to today. D/C instructions signed and placed in chart. Chart given to [facility]. Charge notified. Medications from pts drawer returned to pharmacy

## 2017-10-24 NOTE — PROGRESS NOTES
Bedside report received.  Assessment complete.  A&O x 4. Patient calls appropriately.  Patient up with no assist. Bed alarm in place refused. Pt educated.   Patient has 7/10 pain. Medicated with new order per Dr. Ganser.   Denies N&V.   Surgical lap stabs x5.  + void, + flatus  Patient denies SOB.  SCD's refused. Pt educated.  Patient expected to DC today. Awaiting Dr. Ganser signature of admission order.   Review plan with of care with patient. Call light and personal belongings with in reach. Hourly rounding in place. All needs met at this time.

## 2017-10-24 NOTE — DISCHARGE INSTRUCTIONS
ACTIVITY: Rest and take it easy for the first 24 hours.  A responsible adult is recommended to remain with you during that time.  It is normal to feel sleepy.  We encourage you to not do anything that requires balance, judgment or coordination.    MILD FLU-LIKE SYMPTOMS ARE NORMAL. YOU MAY EXPERIENCE GENERALIZED MUSCLE ACHES, THROAT IRRITATION, HEADACHE AND/OR SOME NAUSEA.    FOR 24 HOURS DO NOT:  Drive, operate machinery or run household appliances.  Drink beer or alcoholic beverages.   Make important decisions or sign legal documents.    SPECIAL INSTRUCTIONS:   Clears without carbonation today; Advance diet as tolerated to full liquids tomorrow; Follow diet progression handout from our office.    Ok to Shower over Tegaderms POD#1; remove Tegaderms on POD#4  No baths or soaks x 7 days  No driving x 4-5 days  No lifting > 20 lbs until after post-op appt  D/C home when alert, comfortable, ambulatory, and tolerating PO well    Begin PPI at home POD#2  (all home med's larger in size than eraser head should be crushed or changed to liquid form x 2-3 weeks, while on liquid diet)  Hold Multi vitamin supplements until POD#8  Follow up with Dr. Ganser ~ 1-2 weeks      DIET: To avoid nausea, slowly advance diet as tolerated, avoiding spicy or greasy foods for the first day.  Add more substantial food to your diet according to your physician's instructions.  Babies can be fed formula or breast milk as soon as they are hungry.  INCREASE FLUIDS AND FIBER TO AVOID CONSTIPATION.    SURGICAL DRESSING/BATHING: see above    FOLLOW-UP APPOINTMENT:  A follow-up appointment should be arranged with your doctor in 1-*2 weeks; call to schedule.    You should CALL YOUR PHYSICIAN if you develop:  Fever greater than 101 degrees F.  Pain not relieved by medication, or persistent nausea or vomiting.  Excessive bleeding (blood soaking through dressing) or unexpected drainage from the wound.  Extreme redness or swelling around the incision  site, drainage of pus or foul smelling drainage.  Inability to urinate or empty your bladder within 8 hours.  Problems with breathing or chest pain.    You should call 911 if you develop problems with breathing or chest pain.  If you are unable to contact your doctor or surgical center, you should go to the nearest emergency room or urgent care center.  Physician's telephone #: [phone]    If any questions arise, call your doctor.  If your doctor is not available, please feel free to call the Surgical Center at [phone].  The Center is open Monday through Friday from 7AM to 7PM.  You can also call the [email or web address] HOTLINE open 24 hours/day, 7 days/week and speak to a nurse at [phone], or toll free at [phone].    A registered nurse may call you a few days after your surgery to see how you are doing after your procedure.    MEDICATIONS: Resume taking daily medication.  Take prescribed pain medication with food.  If no medication is prescribed, you may take non-aspirin pain medication if needed.  PAIN MEDICATION CAN BE VERY CONSTIPATING.  Take a stool softener or laxative such as senokot, pericolace, or milk of magnesia if needed.    Prescription given for norco.  Last pain medication given at 4:05.    If your physician has prescribed pain medication that includes Acetaminophen (Tylenol), do not take additional Acetaminophen (Tylenol) while taking the prescribed medication.    Depression / Suicide Risk    As you are discharged from this [facility] facility, it is important to learn how to keep safe from harming yourself.    Recognize the warning signs:  · Abrupt changes in personality, positive or negative- including increase in energy   · Giving away possessions  · Change in eating patterns- significant weight changes-  positive or negative  · Change in sleeping patterns- unable to sleep or sleeping all the time   · Unwillingness or inability to communicate  · Depression  · Unusual sadness,  discouragement and loneliness  · Talk of wanting to die  · Neglect of personal appearance   · Rebelliousness- reckless behavior  · Withdrawal from people/activities they love  · Confusion- inability to concentrate     If you or a loved one observes any of these behaviors or has concerns about self-harm, here's what you can do:  · Talk about it- your feelings and reasons for harming yourself  · Remove any means that you might use to hurt yourself (examples: pills, rope, extension cords, firearm)  · Get professional help from the community (Mental Health, Substance Abuse, psychological counseling)  · Do not be alone:Call your Safe Contact- someone whom you trust who will be there for you.  · Call your local CRISIS HOTLINE 444-0418 or 817-170-4312  · Call your local Children's Mobile Crisis Response Team [place] (440) 413-5168 or www.[employer]  · Call the toll free National Suicide Prevention Hotlines   · National Suicide Prevention Lifeline 104-039-RIJK (8898)  · National Hope Line Network 800-SUICIDE (050-7082)    Discharge Instructions    Discharged to home by car with relative. Discharged via wheelchair, hospital escort: Yes.  Special equipment needed: Not Applicable    Be sure to schedule a follow-up appointment with your primary care doctor or any specialists as instructed.     Discharge Plan:   Diet Plan: Discussed  Activity Level: Discussed  Confirmed Follow up Appointment: Patient to Call and Schedule Appointment  Confirmed Symptoms Management: Discussed  Medication Reconciliation Updated: Yes  Influenza Vaccine Indication: Patient Refuses    I understand that a diet low in cholesterol, fat, and sodium is recommended for good health. Unless I have been given specific instructions below for another diet, I accept this instruction as my diet prescription.   Other diet: discussed with Ganser     Special Instructions: None    · Is patient discharged on Warfarin / Coumadin?   No     · Is patient Post  Blood Transfusion?  No    Depression / Suicide Risk    As you are discharged from this [facility] facility, it is important to learn how to keep safe from harming yourself.    Recognize the warning signs:  · Abrupt changes in personality, positive or negative- including increase in energy   · Giving away possessions  · Change in eating patterns- significant weight changes-  positive or negative  · Change in sleeping patterns- unable to sleep or sleeping all the time   · Unwillingness or inability to communicate  · Depression  · Unusual sadness, discouragement and loneliness  · Talk of wanting to die  · Neglect of personal appearance   · Rebelliousness- reckless behavior  · Withdrawal from people/activities they love  · Confusion- inability to concentrate     If you or a loved one observes any of these behaviors or has concerns about self-harm, here's what you can do:  · Talk about it- your feelings and reasons for harming yourself  · Remove any means that you might use to hurt yourself (examples: pills, rope, extension cords, firearm)  · Get professional help from the community (Mental Health, Substance Abuse, psychological counseling)  · Do not be alone:Call your Safe Contact- someone whom you trust who will be there for you.  · Call your local CRISIS HOTLINE 492-6175 or 294-850-1235  · Call your local Children's Mobile Crisis Response Team [place] (210) 359-4432 or www.[employer]  · Call the toll free National Suicide Prevention Hotlines   · National Suicide Prevention Lifeline 403-268-UTLH (3181)  · National Hope Line Network 800-SUICIDE (519-9950)

## 2017-10-24 NOTE — PROGRESS NOTES
1850- pt tolerating PO, denies nausea/vomiting, family at bedside.   1915- Dr Ganser informed pt unable to maintain oxygen while sleeping. Pt de sating to 80% RA while sleeping. Pt  States that her family tells her she snores and has a hx of smoking, and quit 2 years ago.   Verbal orders received to keep pt overnight for observation for oxygen needs    2125- REPORT GIVEN TO JOHN MARK , PT TRANSPORT TO BRING PT TO ROOM. PT HAS ALL BELONGINGS

## 2017-10-24 NOTE — PROGRESS NOTES
Changed O2 from 0.5 to 0, O2 sat continued at 88%. While asleep. When awake pt stable o2 sat above 94% with room air. Pt bake to sleep with 0.5 O2, sta stay at 93%.

## 2018-02-07 ENCOUNTER — HOSPITAL ENCOUNTER (OUTPATIENT)
Dept: LAB | Facility: MEDICAL CENTER | Age: 48
End: 2018-02-07
Attending: INTERNAL MEDICINE
Payer: COMMERCIAL

## 2018-02-07 ENCOUNTER — TELEPHONE (OUTPATIENT)
Dept: INTERNAL MEDICINE | Facility: MEDICAL CENTER | Age: 48
End: 2018-02-07

## 2018-02-07 ENCOUNTER — OFFICE VISIT (OUTPATIENT)
Dept: INTERNAL MEDICINE | Facility: MEDICAL CENTER | Age: 48
End: 2018-02-07
Payer: COMMERCIAL

## 2018-02-07 VITALS
DIASTOLIC BLOOD PRESSURE: 76 MMHG | TEMPERATURE: 97.6 F | BODY MASS INDEX: 31.17 KG/M2 | OXYGEN SATURATION: 97 % | HEIGHT: 62 IN | RESPIRATION RATE: 16 BRPM | SYSTOLIC BLOOD PRESSURE: 104 MMHG | HEART RATE: 76 BPM | WEIGHT: 169.4 LBS

## 2018-02-07 DIAGNOSIS — E55.9 VITAMIN D DEFICIENCY: ICD-10-CM

## 2018-02-07 DIAGNOSIS — E03.9 HYPOTHYROIDISM (ACQUIRED): ICD-10-CM

## 2018-02-07 DIAGNOSIS — E66.9 OBESITY (BMI 30-39.9): ICD-10-CM

## 2018-02-07 LAB
25(OH)D3 SERPL-MCNC: 16 NG/ML (ref 30–100)
TSH SERPL DL<=0.005 MIU/L-ACNC: 0.02 UIU/ML (ref 0.38–5.33)

## 2018-02-07 PROCEDURE — 36415 COLL VENOUS BLD VENIPUNCTURE: CPT

## 2018-02-07 PROCEDURE — 99214 OFFICE O/P EST MOD 30 MIN: CPT | Mod: GC | Performed by: INTERNAL MEDICINE

## 2018-02-07 PROCEDURE — 82306 VITAMIN D 25 HYDROXY: CPT

## 2018-02-07 PROCEDURE — 84443 ASSAY THYROID STIM HORMONE: CPT

## 2018-02-07 RX ORDER — LEVOTHYROXINE SODIUM 0.15 MG/1
150 TABLET ORAL
Qty: 30 TAB | Refills: 0 | Status: SHIPPED | OUTPATIENT
Start: 2018-02-07 | End: 2018-02-07

## 2018-02-07 RX ORDER — ERGOCALCIFEROL 1.25 MG/1
50000 CAPSULE ORAL
Qty: 12 CAP | Refills: 0 | Status: SHIPPED | OUTPATIENT
Start: 2018-02-07 | End: 2018-04-03

## 2018-02-07 RX ORDER — LEVOTHYROXINE SODIUM 0.12 MG/1
125 TABLET ORAL
Qty: 30 TAB | Refills: 4 | Status: SHIPPED | OUTPATIENT
Start: 2018-02-07 | End: 2018-04-26 | Stop reason: SDUPTHER

## 2018-02-07 ASSESSMENT — PAIN SCALES - GENERAL: PAINLEVEL: NO PAIN

## 2018-02-07 ASSESSMENT — PATIENT HEALTH QUESTIONNAIRE - PHQ9: CLINICAL INTERPRETATION OF PHQ2 SCORE: 0

## 2018-02-07 NOTE — PROGRESS NOTES
Established Patient    Shelby presents today with the following:    CC: Follow-up on hypothyroidism and to refill medications    HPI: Patient is a 47-year-old female with past medical history of hypothyroidism, vitamin D deficiency, insomnia, GERD and hyperlipidemia who presents today for follow-up on hypothyroidism and medication refill. Patient was last seen in our clinic in January 2017. Her last TSH by that time was 0.77. Patient requesting refill of her Synthroid. She denied any ongoing symptoms and feeling finr on the current dose without problems. Patient was also prescribed vitamin D due to low vitamin D levels. she finished 12 weeks of the 50,000 units weekly. Currently using over-the-counter vitamin D supplements 2000 units daily. Her BMI is noted to be elevated. She reported following healthy diet but not exercising enough.    Patient Active Problem List    Diagnosis Date Noted   • Obesity (BMI 30-39.9) 02/07/2018   • Hiatal hernia 10/23/2017   • GERD without esophagitis 11/22/2016   • Primary insomnia 11/22/2016   • Hyperlipidemia, unspecified 11/22/2016   • Vitamin D deficiency 11/22/2016   • Dependence on nicotine from cigarettes 11/22/2016   • Mixed incontinence urge and stress 11/22/2016   • Hypothyroidism (acquired) 11/22/2016   • Healthcare maintenance 11/22/2016   • Female incontinence 08/19/2016       Current Outpatient Prescriptions   Medication Sig Dispense Refill   • levothyroxine (SYNTHROID) 150 MCG Tab Take 1 Tab by mouth Every morning on an empty stomach. 30 Tab 0   • Aspirin-Acetaminophen-Caffeine (PAMPRIN MAX PO) Take 1 Tab by mouth as needed.       No current facility-administered medications for this visit.            Review of Systems:     Constitutional: Denies fevers, Denies weight changes  Eyes: Denies changes in vision, no eye pain  Ears/Nose/Throat/Mouth: Denies nasal congestion or sore throat   Cardiovascular: Denies chest pain or palpitations   Respiratory: Denies shortness of  "breath , Denies cough  Gastrointestinal/Hepatic: Denies abdominal pain, nausea, vomiting, diarrhea or constipation.  Genitourinary: Denies bladder dysfunction, dysuria or frequency  Musculoskeletal/Rheum: Denies  joint pain and swelling   Skin: Denies rash.  Neurological: Denies headache, confusion, memory loss or focal weakness/parasthesias  Psychiatric: denies mood disorder               /76   Pulse 76   Temp 36.4 °C (97.6 °F)   Resp 16   Ht 1.575 m (5' 2\")   Wt 76.8 kg (169 lb 6.4 oz)   SpO2 97%   BMI 30.98 kg/m²     Physical Exam   Constitutional:  Comfortable. No acute distress. obese  Eyes: Pupils are equal, round, and reactive to light. No scleral icterus.  Neck: Neck supple. No thyromegaly present.   Cardiovascular: Normal rate, regular rhythm and normal heart sounds.  Exam reveals no gallop and no friction rub.  No murmur heard.  Pulmonary/Chest: Lungs clear to ascultation bilaterally. Breath sounds normal. No rhonchi, wheezes or crackles.   Musculoskeletal:   No deformity noted. no edema.   Lymphadenopathy: no cervical adenopathy  Neurological: alert and oriented to person, place, and time. No obvious motor or sensory deficits.  Extremities: No edema. No clubbing. No cyanosis. Distal pulses 2+ bilaterally.  Skin: No Rash. No cyanosis. Nails show no clubbing.      Assessment and Plan    1. Hypothyroidism (acquired)  -Patient denied any ongoing symptoms.  -She is currently on Synthroid 150 MCG's daily.  -Her last TSH is 0.77 year ago.  -We'll repeat TSH level. We'll give her a one-month supply of her Synthroid. We'll adjust dose based on the TSH results.  - TSH; Future  - levothyroxine (SYNTHROID) 150 MCG Tab; Take 1 Tab by mouth Every morning on an empty stomach.  Dispense: 30 Tab; Refill: 0    2. Vitamin D deficiency  -Her last vitamin D level was 22.  -Patient finished 12 weeks of the 50,000 units weekly of vitamin D.  -Currently taking over-the-counter 2,000 units daily. We'll continue for " now.  -We'll recheck vitamin D levels.  - VITAMIN D,25 HYDROXY; Future    3. Obesity (BMI 30-39.9)  -Patient reported following healthy diet.  -Not exercising enough.   -Counseling provided.                Signed by: Ross Be M.D.

## 2018-02-07 NOTE — LETTER
[facility]  Tabitha Phoenix M.D.  [address]  [place] [address]  Fax: [phone]   Authorization for Release/Disclosure of   Protected Health Information   Name: MARIAN CADE : 1970 SSN: [id number]   Address: [address] [place] [address] Phone:    [phone] (home) [phone] (work)   I authorize the entity listed below to release/disclose the PHI below to:   [facility]/Tabitha Phoenix M.D. and Ross Be M.D.   Provider or Entity Name: [facility]     Address   City, [facility], [facility]   Phone: [phone]      Fax:[phone]     Reason for request: continuity of care   Information to be released:    [  ] LAST COLONOSCOPY,  including any PATH REPORT and follow-up  [  ] LAST FIT/COLOGUARD RESULT [  ] LAST DEXA  [X  ] LAST MAMMOGRAM  [ X ] LAST PAP  [  ] LAST LABS [  ] RETINA EXAM REPORT  [  ] IMMUNIZATION RECORDS  [  ] Release all info      [  ] Check here and initial the line next to each item to release ALL health information INCLUDING  _____ Care and treatment for drug and / or alcohol abuse  _____ HIV testing, infection status, or AIDS  _____ Genetic Testing    DATES OF SERVICE OR TIME PERIOD TO BE DISCLOSED: _____________  I understand and acknowledge that:  * This Authorization may be revoked at any time by you in writing, except if your health information has already been used or disclosed.  * Your health information that will be used or disclosed as a result of you signing this authorization could be re-disclosed by the recipient. If this occurs, your re-disclosed health information may no longer be protected by State or Federal laws.  * You may refuse to sign this Authorization. Your refusal will not affect your ability to obtain treatment.  * This Authorization becomes effective upon signing and will  on (date) __________.      If no date is indicated, this Authorization will  one (1) year from the signature date.    Name:  Shelby De Paz    Signature:   Date:     2/7/2018       PLEASE FAX REQUESTED RECORDS BACK TO: [phone]

## 2018-02-07 NOTE — PATIENT INSTRUCTIONS
Vitamin D Deficiency  Vitamin D is an important vitamin that your body needs. Having too little of it in your body is called a deficiency. A very bad deficiency can make your bones soft and can cause a condition called rickets.   Vitamin D is important to your body for different reasons, such as:   · It helps your body absorb 2 minerals called calcium and phosphorus.  · It helps make your bones healthy.  · It may prevent some diseases, such as diabetes and multiple sclerosis.  · It helps your muscles and heart.  You can get vitamin D in several ways. It is a natural part of some foods. The vitamin is also added to some dairy products and cereals. Some people take vitamin D supplements. Also, your body makes vitamin D when you are in the sun. It changes the sun's rays into a form of the vitamin that your body can use.  CAUSES   · Not eating enough foods that contain vitamin D.  · Not getting enough sunlight.  · Having certain digestive system diseases that make it hard to absorb vitamin D. These diseases include Crohn's disease, chronic pancreatitis, and cystic fibrosis.  · Having a surgery in which part of the stomach or small intestine is removed.  · Being obese. Fat cells pull vitamin D out of your blood. That means that obese people may not have enough vitamin D left in their blood and in other body tissues.  · Having chronic kidney or liver disease.  RISK FACTORS  Risk factors are things that make you more likely to develop a vitamin D deficiency. They include:  · Being older.  · Not being able to get outside very much.  · Living in a nursing home.  · Having had broken bones.  · Having weak or thin bones (osteoporosis).  · Having a disease or condition that changes how your body absorbs vitamin D.  · Having dark skin.  · Some medicines such as seizure medicines or steroids.  · Being overweight or obese.  SYMPTOMS  Mild cases of vitamin D deficiency may not have any symptoms. If you have a very bad case, symptoms  may include:  · Bone pain.  · Muscle pain.  · Falling often.  · Broken bones caused by a minor injury, due to osteoporosis.  DIAGNOSIS  A blood test is the best way to tell if you have a vitamin D deficiency.  TREATMENT  Vitamin D deficiency can be treated in different ways. Treatment for vitamin D deficiency depends on what is causing it. Options include:  · Taking vitamin D supplements.  · Taking a calcium supplement. Your caregiver will suggest what dose is best for you.  HOME CARE INSTRUCTIONS  · Take any supplements that your caregiver prescribes. Follow the directions carefully. Take only the suggested amount.  · Have your blood tested 2 months after you start taking supplements.  · Eat foods that contain vitamin D. Healthy choices include:  ¨ Fortified dairy products, cereals, or juices. Fortified means vitamin D has been added to the food. Check the label on the package to be sure.  ¨ Fatty fish like salmon or trout.  ¨ Eggs.  ¨ Oysters.  · Do not use a tanning bed.  · Keep your weight at a healthy level. Lose weight if you need to.  · Keep all follow-up appointments. Your caregiver will need to perform blood tests to make sure your vitamin D deficiency is going away.  SEEK MEDICAL CARE IF:  · You have any questions about your treatment.  · You continue to have symptoms of vitamin D deficiency.  · You have nausea or vomiting.  · You are constipated.  · You feel confused.  · You have severe abdominal or back pain.  MAKE SURE YOU:  · Understand these instructions.  · Will watch your condition.  · Will get help right away if you are not doing well or get worse.     This information is not intended to replace advice given to you by your health care provider. Make sure you discuss any questions you have with your health care provider.     Document Released: 03/11/2013 Document Revised: 04/14/2014 Document Reviewed: 03/11/2013  [employer] Interactive Patient Education ©2016 [employer]     with patient

## 2018-02-08 NOTE — TELEPHONE ENCOUNTER
TSH result 0.02. Vitamin D 16. Patient contacted and notified about the results. We'll decrease Synthroid dose to 125 daily and recheck TSH levels in 6 weeks after the adjustment. We'll also start on high-dose vitamin D, 50,000 units weekly for 12 weeks and recheck levels after that.

## 2018-03-14 ENCOUNTER — OFFICE VISIT (OUTPATIENT)
Dept: INTERNAL MEDICINE | Facility: MEDICAL CENTER | Age: 48
End: 2018-03-14
Payer: COMMERCIAL

## 2018-03-14 VITALS
OXYGEN SATURATION: 95 % | HEART RATE: 73 BPM | TEMPERATURE: 98.2 F | DIASTOLIC BLOOD PRESSURE: 58 MMHG | BODY MASS INDEX: 29.77 KG/M2 | WEIGHT: 168 LBS | SYSTOLIC BLOOD PRESSURE: 108 MMHG | HEIGHT: 63 IN

## 2018-03-14 DIAGNOSIS — K21.9 GERD WITHOUT ESOPHAGITIS: ICD-10-CM

## 2018-03-14 DIAGNOSIS — E55.9 VITAMIN D DEFICIENCY: ICD-10-CM

## 2018-03-14 DIAGNOSIS — N39.46 MIXED INCONTINENCE URGE AND STRESS: ICD-10-CM

## 2018-03-14 DIAGNOSIS — Z00.00 HEALTHCARE MAINTENANCE: ICD-10-CM

## 2018-03-14 DIAGNOSIS — E03.9 HYPOTHYROIDISM (ACQUIRED): ICD-10-CM

## 2018-03-14 DIAGNOSIS — F51.01 PRIMARY INSOMNIA: ICD-10-CM

## 2018-03-14 DIAGNOSIS — E78.5 HYPERLIPIDEMIA, UNSPECIFIED HYPERLIPIDEMIA TYPE: ICD-10-CM

## 2018-03-14 PROCEDURE — 99214 OFFICE O/P EST MOD 30 MIN: CPT | Performed by: INTERNAL MEDICINE

## 2018-03-14 NOTE — PROGRESS NOTES
Shelby De Paz is a 47 y.o. female who is here to Re-establish care. She is a former patient of Dr. Phoenix.     CC: Re-establish, follow up on hypothyroidism    HPI:    Patient is a 47 year old female who is here to re-establish care. Patient was recently seen by Dr. Be and the dose her thyroid medication was decreased at that time. She did gets lab orders to get checked for her thyroid and vitamin D which she has not gotten yet. Patient denied any weight gain or loss, no over skin changes, no major mood changes, she doesn't complain of any headaches or vision changes. Patient does have a history of asthma in childhood but no longer has symptoms. It has been more than 30 years. She used to smoke tobacco. Quit in 2015 as well.    Patient has a history of hyperlipidemia but not on any medication at this time. Was told she didn't need any. She had stress incontinence but that has gone away after she had an operation. Patient also had a fundoplication done for her GERD and no longer has any concerns in regards to that.     She gets PAP smears done through her Gynecologist at [facility] and her Mammograms. She didn't receive the influenza vaccine. Doesn't know when her tetanus vaccine was but patient is not interested at this time.     No problem-specific Assessment & Plan notes found for this encounter.      She  has a past medical history of Asthma; Breath shortness; Bronchitis (09/18/2017); Cold (09/18/2017); Dental disorder; Disorder of thyroid; Finger fracture; Heart burn; Hiatus hernia syndrome; Hyperlipidemia; Indigestion; Insomnia; Polycythemia; Right hand pain; Snoring; Stress incontinence; Tobacco abuse; Urinary incontinence; and Vitamin D deficiency.    Patient Active Problem List    Diagnosis Date Noted   • Obesity (BMI 30-39.9) 02/07/2018   • Hiatal hernia 10/23/2017   • GERD without esophagitis 11/22/2016   • Primary insomnia 11/22/2016   • Hyperlipidemia, unspecified  "11/22/2016   • Vitamin D deficiency 11/22/2016   • Dependence on nicotine from cigarettes 11/22/2016   • Mixed incontinence urge and stress 11/22/2016   • Hypothyroidism (acquired) 11/22/2016   • Healthcare maintenance 11/22/2016   • Female incontinence 08/19/2016       Allergies:Contrast media with iodine [iodine] and Other misc    Current Outpatient Prescriptions   Medication Sig Dispense Refill   • levothyroxine (SYNTHROID) 125 MCG Tab Take 1 Tab by mouth Every morning on an empty stomach. 30 Tab 4   • vitamin D, Ergocalciferol, (DRISDOL) 48191 units Cap capsule Take 1 Cap by mouth every 7 days. 12 Cap 0   • Aspirin-Acetaminophen-Caffeine (PAMPRIN MAX PO) Take 1 Tab by mouth as needed.       No current facility-administered medications for this visit.        Social History   Substance Use Topics   • Smoking status: Former Smoker     Packs/day: 0.50     Years: 20.00     Types: Cigarettes     Quit date: 1/1/2015   • Smokeless tobacco: Never Used   • Alcohol use Yes      Comment: 5-10 per month       Family History   Problem Relation Age of Onset   • Heart Attack Mother 57   • Heart Attack  52     GF       Review of Systems:   Pertinent positives as stated in HPI, all others reviewed as negative.    Physical Exam:  Blood pressure 108/58, pulse 73, temperature 36.8 °C (98.2 °F), height 1.588 m (5' 2.52\"), weight 76.2 kg (168 lb), SpO2 95 %. Body mass index is 30.22 kg/m².    General Appearance: overweight, alert, no distress, cooperative  Skin: No rashes or lesions.  Head: Normocephalic. No masses appreciated.   Eyes: PERRLA.  Ears: External ears normal.  Oropharynx: Oropharynx moist and without lesion.  Neck: Neck supple. No adenopathy.   Lungs: Lungs clear to auscultation bilaterally.  Heart: RRR without murmur, gallop, or rubs.  Abdomen: Soft, non-tender. BS normal. No masses,  No organomegaly  Musculoskeletal: Extremities: Upper and lower extremities appear normal. No deformities, edema.   Peripheral Pulses: " Pulses: radial=4/4, dorsalis pedis=4/4  Neurologic: Cranial nerves intact.   Psychiatric: Mood appears normal.     Assessment/Plan:     1. Healthcare maintenance  - Up to date on mammogram and PAP smear. She gets them through Gynecology.  - Declined vaccinations.  - Labs recently done. Lab work reprinted for her thyroid and vitamin D.    2. GERD without esophagitis  - Had fundoplication performed.  - No longer having symptoms and not on any medications at this time.    3. Primary insomnia  - No issues currently. Doing well.     4. Hyperlipidemia, unspecified hyperlipidemia type  - Not on medication. Will check lipid panel at next visit.    5. Vitamin D deficiency  - On Vitamin D 50,000 IU weekly.  - Lab printed to re-check levels after 3 months of therapy.      6. Mixed incontinence urge and stress  - No longer symptomatic.   - Had procedure done which helped. No complaints.    7. Hypothyroidism (acquired)  - On Synthroid 125mcg. Does was reduced during recent visit.  - TSH repeat order given. Advised to do about 6-8 weeks after changing dose.       Followup: Return in about 1 year (around 3/14/2019), or if symptoms worsen or fail to improve.

## 2018-03-21 ENCOUNTER — TELEPHONE (OUTPATIENT)
Dept: INTERNAL MEDICINE | Facility: MEDICAL CENTER | Age: 48
End: 2018-03-21

## 2018-03-21 DIAGNOSIS — L98.9 SKIN LESION OF LEFT ARM: ICD-10-CM

## 2018-03-21 NOTE — TELEPHONE ENCOUNTER
Called pt left detailed message why the request for dermatology is it for a skin check or for a skin abnormality asked pt to call office back

## 2018-03-21 NOTE — TELEPHONE ENCOUNTER
VOICEMAIL  1. Caller Name: Shelby De Paz  Call Back Number: [phone] (home) [phone] (work)      2. Message: Would like a referral to see Dr. Leong.  Can you please place in referral.  She was unable to schedule appointment with her until the referral is placed and approved by Dr. Leong     3. Patient approves office to leave a detailed voicemail/MyChart message: N\A

## 2018-03-23 NOTE — TELEPHONE ENCOUNTER
Patient stated that she spoke with you about it.  Its a spot in the L arm.  She states you told her what you though it was and that asked her if her insurance needed a referral from her PCP and she stated that she didn't needed it.  You explained to her that she should definitely see a dermatologist and Dr. Leong usually comes into our office.

## 2018-04-02 ENCOUNTER — HOSPITAL ENCOUNTER (OUTPATIENT)
Dept: LAB | Facility: MEDICAL CENTER | Age: 48
End: 2018-04-02
Attending: INTERNAL MEDICINE
Payer: COMMERCIAL

## 2018-04-02 DIAGNOSIS — E55.9 VITAMIN D DEFICIENCY: ICD-10-CM

## 2018-04-02 DIAGNOSIS — E03.9 HYPOTHYROIDISM (ACQUIRED): ICD-10-CM

## 2018-04-02 LAB
25(OH)D3 SERPL-MCNC: 32 NG/ML (ref 30–100)
TSH SERPL DL<=0.005 MIU/L-ACNC: 3.39 UIU/ML (ref 0.38–5.33)

## 2018-04-02 PROCEDURE — 36415 COLL VENOUS BLD VENIPUNCTURE: CPT

## 2018-04-02 PROCEDURE — 84443 ASSAY THYROID STIM HORMONE: CPT

## 2018-04-02 PROCEDURE — 82306 VITAMIN D 25 HYDROXY: CPT

## 2018-04-03 DIAGNOSIS — E55.9 VITAMIN D DEFICIENCY: ICD-10-CM

## 2018-04-03 RX ORDER — MULTIVIT-MIN/IRON/FOLIC ACID/K 18-600-40
2000 CAPSULE ORAL DAILY
Qty: 90 CAP | Refills: 3 | Status: SHIPPED | OUTPATIENT
Start: 2018-04-03 | End: 2019-03-28

## 2018-04-03 NOTE — PROGRESS NOTES
TSH normal. Will continue current dose of synthroid. Vitamin D 32. Will start on 2000 units daily. Patient notified through [employer].

## 2018-04-10 ENCOUNTER — HOSPITAL ENCOUNTER (OUTPATIENT)
Dept: LAB | Facility: MEDICAL CENTER | Age: 48
End: 2018-04-10
Attending: PHYSICIAN ASSISTANT
Payer: COMMERCIAL

## 2018-04-10 ENCOUNTER — OFFICE VISIT (OUTPATIENT)
Dept: URGENT CARE | Facility: PHYSICIAN GROUP | Age: 48
End: 2018-04-10
Payer: COMMERCIAL

## 2018-04-10 VITALS
TEMPERATURE: 98.2 F | HEIGHT: 63 IN | WEIGHT: 168 LBS | SYSTOLIC BLOOD PRESSURE: 104 MMHG | BODY MASS INDEX: 29.77 KG/M2 | DIASTOLIC BLOOD PRESSURE: 68 MMHG | RESPIRATION RATE: 12 BRPM | HEART RATE: 82 BPM | OXYGEN SATURATION: 97 %

## 2018-04-10 DIAGNOSIS — R42 EPISODIC LIGHTHEADEDNESS: ICD-10-CM

## 2018-04-10 DIAGNOSIS — I45.10 RBBB: ICD-10-CM

## 2018-04-10 LAB
ALBUMIN SERPL BCP-MCNC: 4.9 G/DL (ref 3.2–4.9)
ALBUMIN/GLOB SERPL: 2 G/DL
ALP SERPL-CCNC: 60 U/L (ref 30–99)
ALT SERPL-CCNC: 28 U/L (ref 2–50)
ANION GAP SERPL CALC-SCNC: 6 MMOL/L (ref 0–11.9)
AST SERPL-CCNC: 22 U/L (ref 12–45)
BASOPHILS # BLD AUTO: 0.5 % (ref 0–1.8)
BASOPHILS # BLD: 0.05 K/UL (ref 0–0.12)
BILIRUB SERPL-MCNC: 0.3 MG/DL (ref 0.1–1.5)
BUN SERPL-MCNC: 13 MG/DL (ref 8–22)
CALCIUM SERPL-MCNC: 9.6 MG/DL (ref 8.5–10.5)
CHLORIDE SERPL-SCNC: 102 MMOL/L (ref 96–112)
CO2 SERPL-SCNC: 29 MMOL/L (ref 20–33)
CREAT SERPL-MCNC: 1.02 MG/DL (ref 0.5–1.4)
EKG 4674: NORMAL
EOSINOPHIL # BLD AUTO: 0.08 K/UL (ref 0–0.51)
EOSINOPHIL NFR BLD: 0.7 % (ref 0–6.9)
ERYTHROCYTE [DISTWIDTH] IN BLOOD BY AUTOMATED COUNT: 45.7 FL (ref 35.9–50)
GLOBULIN SER CALC-MCNC: 2.4 G/DL (ref 1.9–3.5)
GLUCOSE SERPL-MCNC: 119 MG/DL (ref 65–99)
HCT VFR BLD AUTO: 41.7 % (ref 37–47)
HGB BLD-MCNC: 13.9 G/DL (ref 12–16)
IMM GRANULOCYTES # BLD AUTO: 0.04 K/UL (ref 0–0.11)
IMM GRANULOCYTES NFR BLD AUTO: 0.4 % (ref 0–0.9)
LYMPHOCYTES # BLD AUTO: 1.56 K/UL (ref 1–4.8)
LYMPHOCYTES NFR BLD: 14.3 % (ref 22–41)
MCH RBC QN AUTO: 31.3 PG (ref 27–33)
MCHC RBC AUTO-ENTMCNC: 33.3 G/DL (ref 33.6–35)
MCV RBC AUTO: 93.9 FL (ref 81.4–97.8)
MONOCYTES # BLD AUTO: 0.42 K/UL (ref 0–0.85)
MONOCYTES NFR BLD AUTO: 3.9 % (ref 0–13.4)
NEUTROPHILS # BLD AUTO: 8.73 K/UL (ref 2–7.15)
NEUTROPHILS NFR BLD: 80.2 % (ref 44–72)
NRBC # BLD AUTO: 0 K/UL
NRBC BLD-RTO: 0 /100 WBC
PLATELET # BLD AUTO: 304 K/UL (ref 164–446)
PMV BLD AUTO: 10.4 FL (ref 9–12.9)
POTASSIUM SERPL-SCNC: 4 MMOL/L (ref 3.6–5.5)
PROT SERPL-MCNC: 7.3 G/DL (ref 6–8.2)
RBC # BLD AUTO: 4.44 M/UL (ref 4.2–5.4)
SODIUM SERPL-SCNC: 137 MMOL/L (ref 135–145)
WBC # BLD AUTO: 10.9 K/UL (ref 4.8–10.8)

## 2018-04-10 PROCEDURE — 99214 OFFICE O/P EST MOD 30 MIN: CPT | Performed by: PHYSICIAN ASSISTANT

## 2018-04-10 PROCEDURE — 85025 COMPLETE CBC W/AUTO DIFF WBC: CPT

## 2018-04-10 PROCEDURE — 36415 COLL VENOUS BLD VENIPUNCTURE: CPT

## 2018-04-10 PROCEDURE — 80053 COMPREHEN METABOLIC PANEL: CPT

## 2018-04-10 PROCEDURE — 93000 ELECTROCARDIOGRAM COMPLETE: CPT | Performed by: PHYSICIAN ASSISTANT

## 2018-04-10 NOTE — PROGRESS NOTES
"Chief Complaint   Patient presents with   • Dizziness     with hot flashes, chills, nausea off and on today        HISTORY OF PRESENT ILLNESS: Patient is a 47 y.o. female who presents today for 1 day of \"dizziness.\"  Patient states that she was sitting at work and states she got lightheaded and had some tunnel vision.  She had not been getting up or down and was sitting still when it started.   She states it has come and gone 4-5 times today without suspected causation, regardless of position changes.   She does not feel the room is spinning.  She states her vision is normal currently and has not had further episodes of vision changes.   Some mild headache, dull, diffuse.  She denies feeling any SOB, palpitations, indigestion, neck pain, jaw pain or chest discomfort.  Non smoker.   LMP 1 week ago.  Patient does have hx of heavier periods.  Last CBC normal in Oct 2017 no hx of anemia.   Does not take blood pressure medication.  TSH just checked WNL.   [employer] hot flushes as well.  Not sure if she was getting a fever but denies any symptoms of infection such as sore throat, nasal congestion, body aches, cough, nausea, vomiting, diarrhea or dysuria/urgency/frequency of urination.  No attempted interventions.     Patient Active Problem List    Diagnosis Date Noted   • Obesity (BMI 30-39.9) 02/07/2018   • Hiatal hernia 10/23/2017   • GERD without esophagitis 11/22/2016   • Primary insomnia 11/22/2016   • Hyperlipidemia, unspecified 11/22/2016   • Vitamin D deficiency 11/22/2016   • Dependence on nicotine from cigarettes 11/22/2016   • Mixed incontinence urge and stress 11/22/2016   • Hypothyroidism (acquired) 11/22/2016   • Healthcare maintenance 11/22/2016   • Female incontinence 08/19/2016       Allergies:Contrast media with iodine [iodine] and Other misc    Current Outpatient Prescriptions Ordered in [facility]   Medication Sig Dispense Refill   • Cholecalciferol (VITAMIN D) 2000 units Cap Take 1 Cap by mouth every day. 90 Cap " "3   • levothyroxine (SYNTHROID) 125 MCG Tab Take 1 Tab by mouth Every morning on an empty stomach. 30 Tab 4   • Aspirin-Acetaminophen-Caffeine (PAMPRIN MAX PO) Take 1 Tab by mouth as needed.       No current Epic-ordered facility-administered medications on file.        Past Medical History:   Diagnosis Date   • Asthma     In childhood. Has not had it for 30 years.   • Breath shortness     with cold 9/2017   • Bronchitis 09/18/2017   • Cold 09/18/2017    productive cough   • Dental disorder     condition issues   • Disorder of thyroid    • Finger fracture    • Heart burn    • Hiatus hernia syndrome    • Hyperlipidemia    • Indigestion    • Insomnia    • Polycythemia    • Right hand pain    • Snoring    • Stress incontinence    • Tobacco abuse    • Urinary incontinence     corrected with surgery   • Vitamin D deficiency        Social History   Substance Use Topics   • Smoking status: Former Smoker     Packs/day: 0.50     Years: 20.00     Types: Cigarettes     Quit date: 1/1/2015   • Smokeless tobacco: Never Used   • Alcohol use Yes      Comment: 5-10 per month       Family Status   Relation Status   • Mother    •       Family History   Problem Relation Age of Onset   • Heart Attack Mother 57   • Heart Attack  52     GF       ROS:  Review of Systems   Constitutional: SEE HPI  HENT:  Negative for ear pain, sinus pressure/congestion, sore throat  Eyes: Negative for blurred vision.   Respiratory: Negative for cough, sputum production, shortness of breath and wheezing.    Cardiovascular: Negative for chest pain, palpitations, orthopnea and leg swelling.   Gastrointestinal: Negative for heartburn, nausea, vomiting and abdominal pain.   Neuro: SEE HPI  All other systems reviewed and are negative.       Exam:  Blood pressure 108/62, pulse 83, temperature 36.8 °C (98.2 °F), resp. rate 12, height 1.588 m (5' 2.5\"), weight 76.2 kg (168 lb), last menstrual period 04/03/2018, SpO2 97 %.  General:  Well nourished, well developed " female in NAD  Eyes: PERRLA, EOM within normal limits, no conjunctival injection, no scleral icterus, visual fields and acuity grossly intact.  Ears: Normal shape and symmetry, no tenderness, no discharge. External canals are without any significant edema or erythema. Tympanic membranes are without any inflammation, no effusion. Gross auditory acuity is intact  Nose: Symmetrical, sinuses without tenderness, no discharge.   Mouth: reasonable hygiene, no erythema exudates or tonsillar enlargement.  Neck: no masses, range of motion within normal limits, no tracheal deviation. No lymphadenopathy  Pulmonary: Normal respiratory effort, no wheezes, crackles, or rhonchi.  Cardiovascular: regular rate and rhythm without murmurs, rubs, or gallops.  Abdomen: Soft, nontender, nondistended. Normal bowel sounds. No hepatosplenomegaly or masses, or hernias. No rebound or guarding.  Skin: No visible rashes or lesion. Warm, pink, dry.   Extremities: no clubbing, cyanosis, or edema.  Neuro: A&O x 3. Speech normal/clear.  Normal gait.       EKG Interpretation   Interpreted by me   Rhythm: normal sinus   Rate: normal   Axis: normal   Ectopy: none   Conduction: RBBB  ST Segments: no acute change   T Waves: no acute change   Q Waves: none   Clinical Impression: no acute changes, RBBB no previous comparison.         Assessment/Plan:  1. Episodic lightheadedness  EKG    CBC WITH DIFFERENTIAL    COMP METABOLIC PANEL       -EKG - RBBB but otherwise grossly normal.   -labs, grossly unremarkable,  No hx of CKD, suspect mild dehydration.   -orthostatics, WNL.  No orthostatic hypotension.   -more than 25 mins were spent in face to face with patient today.       820 pm relayed results of labs to patient who notes since home no further episodes, has been relaxing and feeling well.  She will follow up with PCP and/or RTC at any new concerns, recurrences.          Supportive care, differential diagnoses, and indications for immediate follow-up  discussed with patient.   Pathogenesis of diagnosis discussed including typical length and natural progression.   Instructed to return to clinic or nearest emergency department for any change in condition, further concerns, or worsening of symptoms.  Patient states understanding of the plan of care and discharge instructions.  Instructed to make an appointment, for follow up, with their primary care provider.      Melissa Wagner P.A.-C.

## 2018-04-26 DIAGNOSIS — E03.9 HYPOTHYROIDISM (ACQUIRED): ICD-10-CM

## 2018-04-26 RX ORDER — LEVOTHYROXINE SODIUM 0.12 MG/1
125 TABLET ORAL
Qty: 90 TAB | Refills: 0 | Status: SHIPPED | OUTPATIENT
Start: 2018-04-26 | End: 2018-08-28 | Stop reason: SDUPTHER

## 2018-04-26 NOTE — TELEPHONE ENCOUNTER
Last seen: 03/14/18 by Dr. Cantu  Next appt: None     Was the patient seen in the last year in this department? Yes   Does patient have an active prescription for medications requested? No   Received Request Via: Pharmacy

## 2018-06-05 ENCOUNTER — OFFICE VISIT (OUTPATIENT)
Dept: INTERNAL MEDICINE | Facility: MEDICAL CENTER | Age: 48
End: 2018-06-05
Payer: COMMERCIAL

## 2018-06-05 VITALS — WEIGHT: 170.4 LBS | BODY MASS INDEX: 30.19 KG/M2 | HEIGHT: 63 IN

## 2018-06-05 DIAGNOSIS — D23.62: ICD-10-CM

## 2018-06-05 DIAGNOSIS — D18.00 ANGIOMA: ICD-10-CM

## 2018-06-05 DIAGNOSIS — L81.4 LENTIGO: ICD-10-CM

## 2018-06-05 PROCEDURE — 99202 OFFICE O/P NEW SF 15 MIN: CPT | Performed by: DERMATOLOGY

## 2018-06-05 NOTE — PROGRESS NOTES
"Shelby De Paz  1970  [id number]    Consultation             Vitals:    06/05/18 1123   Weight: 77.3 kg (170 lb 6.4 oz)   Height: 1.588 m (5' 2.5\")       Chief Complaint   Patient presents with   • New Patient     White lesion on left arm      ___________________________________________________________________            History of Present Illness (HPI) c/o  L arm spot x couple years. Pt unsure if growing  Feels deeper under skin. Crusty. No prior rx. check brown spot on L upper back x a while.  Check red spots on abdomen.         Dr. Cantu has referred for a consultation to evaluate spots    Current Outpatient Prescriptions on File Prior to Visit   Medication Sig Dispense Refill   • levothyroxine (SYNTHROID) 125 MCG Tab Take 1 Tab by mouth Every morning on an empty stomach. 90 Tab 0   • Cholecalciferol (VITAMIN D) 2000 units Cap Take 1 Cap by mouth every day. 90 Cap 3   • Aspirin-Acetaminophen-Caffeine (PAMPRIN MAX PO) Take 1 Tab by mouth as needed.       No current facility-administered medications on file prior to visit.      Contrast media with iodine [iodine] and Other misc      Review of Systems:        General: Denies fever      Hematologic: no excessive bleeding problems              Past Medical History:   Diagnosis Date   • Asthma     In childhood. Has not had it for 30 years.   • Breath shortness     with cold 9/2017   • Bronchitis 09/18/2017   • Cold 09/18/2017    productive cough   • Dental disorder     condition issues   • Disorder of thyroid    • Finger fracture    • Heart burn    • Hiatus hernia syndrome    • Hyperlipidemia    • Indigestion    • Insomnia    • Polycythemia    • Right hand pain    • Snoring    • Stress incontinence    • Tobacco abuse    • Urinary incontinence     corrected with surgery   • Vitamin D deficiency      Skin Cancer no h/o    Social History   Substance Use Topics   • Smoking status: Former Smoker     Packs/day: 0.50     Years: 20.00     Types: Cigarettes "     Quit date: 1/1/2015   • Smokeless tobacco: Never Used   • Alcohol use Yes      Comment: 5-10 per month     Sunscreen Use:Yes     Past Surgical History:   Procedure Laterality Date   • NISSEN FUNDOPLICATION LAPAROSCOPIC  10/23/2017    Procedure: NISSEN FUNDOPLICATION LAPAROSCOPIC;  Surgeon: John H Ganser, M.D.;  Location: SURGERY [facility];  Service: General   • BLADDER SLING FEMALE  8/19/2016    Procedure: BLADDER SLING FEMALE FOR : transvaginal tape ;  Surgeon: Travis Yepez M.D.;  Location: SURGERY [facility];  Service:    • OTHER  2000    sinus surgey   • DENTAL EXTRACTION(S)     • OTHER      T&A           Family History   Problem Relation Age of Onset   • Heart Attack Mother 57   • Heart Attack  52     GF         Physical Exam:   Constitutional: Well nourished, NAD, pleasant  alert and cooperative; normal mood and affect; normal attention span and concentration.    Skin   L upper arm middle pink dimpling firm pap   Upper body exam done: no suspicious lesions on eyelids, lips, face, ears, neck, chest, back, abdomen, upper extremities, fingernails except as noted.   Abdomen several red vascular paps  Arms several brown macs  L post shoulder upper back with brown mac                    Assessment and Plan:   1. Dermatofibroma of deltoid region, left  Explained spot is like a scar. If removed, would trade for another scar. Recommend follow for changes.   Recommend follow for changes. ABCD's of changing skin lesions were reviewed, and the appropriate use of sunscreen was outlined and recommended.      2. Lentigo  Recommend follow for changes. ABCD's of changing skin lesions were reviewed, and the appropriate use of sunscreen was outlined and recommended.  Follow spot on L upper back.    3. Angioma  Explained spot is a blood vessel. Recommend follow for changes. ABCD's of changing skin lesions were reviewed, and the appropriate use of sunscreen was outlined and recommended.        Carmelita Leong M.D.    Return if symptoms worsen or fail to improve.

## 2018-08-28 DIAGNOSIS — E03.9 HYPOTHYROIDISM (ACQUIRED): ICD-10-CM

## 2018-08-28 RX ORDER — LEVOTHYROXINE SODIUM 0.12 MG/1
125 TABLET ORAL
Qty: 90 TAB | Refills: 0 | Status: SHIPPED | OUTPATIENT
Start: 2018-08-28 | End: 2018-12-17 | Stop reason: SDUPTHER

## 2018-12-17 DIAGNOSIS — E03.9 HYPOTHYROIDISM (ACQUIRED): ICD-10-CM

## 2018-12-17 RX ORDER — LEVOTHYROXINE SODIUM 0.12 MG/1
125 TABLET ORAL
Qty: 90 TAB | Refills: 0 | Status: SHIPPED | OUTPATIENT
Start: 2018-12-17 | End: 2019-04-11 | Stop reason: SDUPTHER

## 2019-02-11 ENCOUNTER — HOSPITAL ENCOUNTER (OUTPATIENT)
Dept: LAB | Facility: MEDICAL CENTER | Age: 49
End: 2019-02-11
Attending: PHYSICIAN ASSISTANT
Payer: COMMERCIAL

## 2019-02-11 LAB — CYTOLOGY REG CYTOL: NORMAL

## 2019-02-11 PROCEDURE — 88175 CYTOPATH C/V AUTO FLUID REDO: CPT

## 2019-02-28 ENCOUNTER — OFFICE VISIT (OUTPATIENT)
Dept: INTERNAL MEDICINE | Facility: MEDICAL CENTER | Age: 49
End: 2019-02-28
Payer: COMMERCIAL

## 2019-02-28 VITALS
OXYGEN SATURATION: 97 % | SYSTOLIC BLOOD PRESSURE: 82 MMHG | HEIGHT: 63 IN | BODY MASS INDEX: 30.3 KG/M2 | DIASTOLIC BLOOD PRESSURE: 60 MMHG | HEART RATE: 90 BPM | RESPIRATION RATE: 20 BRPM | TEMPERATURE: 98.7 F | WEIGHT: 171 LBS

## 2019-02-28 DIAGNOSIS — R55 SYNCOPE, UNSPECIFIED SYNCOPE TYPE: ICD-10-CM

## 2019-02-28 PROCEDURE — 99214 OFFICE O/P EST MOD 30 MIN: CPT | Mod: GC | Performed by: INTERNAL MEDICINE

## 2019-02-28 RX ORDER — MECLIZINE HYDROCHLORIDE 25 MG/1
25 TABLET ORAL 3 TIMES DAILY PRN
Qty: 60 TAB | Refills: 3 | Status: SHIPPED | OUTPATIENT
Start: 2019-02-28 | End: 2023-02-22

## 2019-02-28 NOTE — PROGRESS NOTES
Established Patient    Shelby presents today with the following:    CC:   Chief Complaint   Patient presents with   • Dizziness     2 weeks          HPI:   48-year-old pleasant female, with history of hypothyroidism, dyslipidemia, he had thought hernia status post Galindo fundoplication who is here with above complaint.  2 weeks ago patient woke up from bed felt dizzy and lightheaded, and while sitting on the toilet bowl she passed out, felt nauseous, sweaty, and her  picked her to the bed.  She recovered fully after a few minutes (?  3-5 minutes).  Patient states there was no movement of extremities, as per .  This was the only time she felt she had blackout.  Denies any history of seizures, headaches associated with episodes.  Several multiple episodes have occurred since then, and usually present  whenever she moves her head even while sitting, or movement/turning in  bed.  Symptoms are associated with tingling, nausea, sweatiness and occasionally patient has tunneled vision during episode.   She however denies spinning objects.  Denies shortness of breath, chest pain, palpitations, orthostatic vitals negative in the office today.  Though patient has low blood pressure.  Her baseline blood pressures usually low usually 90s-100 systolic.  No head trauma.  Denies tinnitus.  No history of cardiac disease, no history of seizures, or strokes.  No blurry vision.    Of note patient has had similar symptoms about 1 year ago, but abated until current episodes.    Patient Active Problem List    Diagnosis Date Noted   • Syncope 02/28/2019   • Dermatofibroma of deltoid region, left 06/05/2018   • Lentigo 06/05/2018   • Angioma 06/05/2018   • Obesity (BMI 30-39.9) 02/07/2018   • Hiatal hernia 10/23/2017   • GERD without esophagitis 11/22/2016   • Primary insomnia 11/22/2016   • Hyperlipidemia, unspecified 11/22/2016   • Vitamin D deficiency 11/22/2016   • Dependence on nicotine from cigarettes 11/22/2016  "  • Mixed incontinence urge and stress 11/22/2016   • Hypothyroidism (acquired) 11/22/2016   • Healthcare maintenance 11/22/2016       Current Outpatient Prescriptions   Medication Sig Dispense Refill   • meclizine (ANTIVERT) 25 MG Tab Take 1 Tab by mouth 3 times a day as needed. 60 Tab 3   • levothyroxine (SYNTHROID) 125 MCG Tab Take 1 Tab by mouth Every morning on an empty stomach. 90 Tab 0   • Cholecalciferol (VITAMIN D) 2000 units Cap Take 1 Cap by mouth every day. 90 Cap 3   • Aspirin-Acetaminophen-Caffeine (PAMPRIN MAX PO) Take 1 Tab by mouth as needed.       No current facility-administered medications for this visit.        ROS: As per HPI. Additional pertinent systems as noted below.  Constitutional: Negative for chills and fever.   HENT: Negative for ear pain and sore throat.    Eyes: Negative for discharge and redness.   Respiratory: Negative for cough, hemoptysis, wheezing and stridor.    Cardiovascular: Negative for chest pain, palpitations and leg swelling.   Gastrointestinal: Negative for abdominal pain, constipation, diarrhea, heartburn, nausea and vomiting.   Genitourinary: Negative for dysuria, flank pain and hematuria.   Musculoskeletal: Negative for falls and myalgias.   Skin: Negative for itching and rash.   Neurological: As in HPI  Endo/Heme/Allergies: Negative for polydipsia. Does not bruise/bleed easily.   Psychiatric/Behavioral: Negative for substance abuse and suicidal ideas.       BP (!) 82/60 (BP Location: Left arm, Patient Position: Standing, BP Cuff Size: Adult)   Pulse 90   Temp 37.1 °C (98.7 °F) (Temporal)   Resp 20   Ht 1.588 m (5' 2.5\")   Wt 77.6 kg (171 lb)   LMP 02/07/2019 (Approximate)   SpO2 97%   BMI 30.78 kg/m²     Physical Exam   General: Female, not in distress  HEENT: Normocephalic, atraumatic, no jaundice or scleral icterus, no pallor, No cervical lymphadenopathy, no thyromegaly,  No tonsillar exudate, no throat erythyema,  PERLL, EOMI,  Respiratory:lungs clear to " auscultation b/l, breath sounds vesicular, air entry adequate b/l,no wheezing, rales or crackles  Cardiac:Regular, rate and rhythm, , S1/S2 present, no M/R/G  GI: abdomen non distended, soft, non tender, no organomegaly, bowel sounds normoactive  Neuro: AAOX4,  Positive Loiza-Hallpike maneuver  Psychiatry: Good judgment, good mood  Msk/Extremities: radial, dorsalis pedis pulses 2+b/l, no joint swelling or redness, ROM intact, no lower  extremity edema  Skin: No rash        Note: I have reviewed all pertinent labs and diagnostic tests associated with this visit with specific comments listed under the assessment and plan below    Assessment and Plan    1. Syncope, likely benign paroxysmal positional vertigo   2 weeks history of feeling lightheadedness, episode of syncope, nauseous, sweaty, tingling sensation during episodes.  No blurry vision, no associated headaches, no tinnitus, no vertiginous symptoms.  Symptoms worse with movement in bed, tending of head  No palpitations, chest pain, shortness of breath   Examination positive for [place]-Hallpike maneuver  Differential diagnosis: BPPV.  Other possibility is cardio inhibitory syncope/vasovagal syncope, given patient was sweaty, momentarily lost consciousness and became lightheaded.  Less likely vasodepressor effect though patient noted to have blood pressure in the 80s, this is not responsible for the presenting symptoms, since this is intermittent, and patient's baseline blood pressure is that low.  Besides orthostatic negative today.  Medications reviewed  - Will however investigate for cardiac cause of syncope, hence Holter monitor 48 hours.  -Patient educated on Epley maneuver, and resources given to patient.  -Meclizine 25 mg 3 times as needed  -Consider vestibular therapy if no improvement after the above intervention.  -Educated to keep herself hydrated, and occasionally monitor her blood pressure, since it was significantly low today, in the 80s.    Followup:  Return in about 4 weeks (around 3/28/2019).      Signed by: Chaparro Cooper M.D.    A computerized dictation system may have been used for this note.    Despite review, there may be some spelling or grammatical errors.

## 2019-02-28 NOTE — PATIENT INSTRUCTIONS
Go to this site and learn this maneuver: https://www.GENBAND[employer].com/watch?[email or web address]    Can take meclizine 25 mg three times a day as needed     Benign Positional Vertigo  Introduction  Vertigo is the feeling that you or your surroundings are moving when they are not. Benign positional vertigo is the most common form of vertigo. The cause of this condition is not serious (is benign). This condition is triggered by certain movements and positions (is positional). This condition can be dangerous if it occurs while you are doing something that could endanger you or others, such as driving.  What are the causes?  In many cases, the cause of this condition is not known. It may be caused by a disturbance in an area of the inner ear that helps your brain to sense movement and balance. This disturbance can be caused by a viral infection (labyrinthitis), head injury, or repetitive motion.  What increases the risk?  This condition is more likely to develop in:  · Women.  · People who are 50 years of age or older.  What are the signs or symptoms?  Symptoms of this condition usually happen when you move your head or your eyes in different directions. Symptoms may start suddenly, and they usually last for less than a minute. Symptoms may include:  · Loss of balance and falling.  · Feeling like you are spinning or moving.  · Feeling like your surroundings are spinning or moving.  · Nausea and vomiting.  · Blurred vision.  · Dizziness.  · Involuntary eye movement (nystagmus).  Symptoms can be mild and cause only slight annoyance, or they can be severe and interfere with daily life. Episodes of benign positional vertigo may return (recur) over time, and they may be triggered by certain movements. Symptoms may improve over time.  How is this diagnosed?  This condition is usually diagnosed by medical history and a physical exam of the head, neck, and ears. You may be referred to a health care provider who specializes in ear, nose,  and throat (ENT) problems (otolaryngologist) or a provider who specializes in disorders of the nervous system (neurologist). You may have additional testing, including:  · MRI.  · A CT scan.  · Eye movement tests. Your health care provider may ask you to change positions quickly while he or she watches you for symptoms of benign positional vertigo, such as nystagmus. Eye movement may be tested with an electronystagmogram (ENG), caloric stimulation, the [place]-Hallpike test, or the roll test.  · An electroencephalogram (EEG). This records electrical activity in your brain.  · Hearing tests.  How is this treated?  Usually, your health care provider will treat this by moving your head in specific positions to adjust your inner ear back to normal. Surgery may be needed in severe cases, but this is rare. In some cases, benign positional vertigo may resolve on its own in 2-4 weeks.  Follow these instructions at home:  Safety  · Move slowly.Avoid sudden body or head movements.  · Avoid driving.  · Avoid operating heavy machinery.  · Avoid doing any tasks that would be dangerous to you or others if a vertigo episode would occur.  · If you have trouble walking or keeping your balance, try using a cane for stability. If you feel dizzy or unstable, sit down right away.  · Return to your normal activities as told by your health care provider. Ask your health care provider what activities are safe for you.  General instructions  · Take over-the-counter and prescription medicines only as told by your health care provider.  · Avoid certain positions or movements as told by your health care provider.  · Drink enough fluid to keep your urine clear or pale yellow.  · Keep all follow-up visits as told by your health care provider. This is important.  Contact a health care provider if:  · You have a fever.  · Your condition gets worse or you develop new symptoms.  · Your family or friends notice any behavioral changes.  · Your nausea or  vomiting gets worse.  · You have numbness or a “pins and needles” sensation.  Get help right away if:  · You have difficulty speaking or moving.  · You are always dizzy.  · You faint.  · You develop severe headaches.  · You have weakness in your legs or arms.  · You have changes in your hearing or vision.  · You develop a stiff neck.  · You develop sensitivity to light.  This information is not intended to replace advice given to you by your health care provider. Make sure you discuss any questions you have with your health care provider.  Document Released: 09/25/2007 Document Revised: 05/25/2017 Document Reviewed: 04/11/2016  © 2017 [employer]

## 2019-03-04 ENCOUNTER — NON-PROVIDER VISIT (OUTPATIENT)
Dept: CARDIOLOGY | Facility: MEDICAL CENTER | Age: 49
End: 2019-03-04
Payer: COMMERCIAL

## 2019-03-04 DIAGNOSIS — I49.3 PVC (PREMATURE VENTRICULAR CONTRACTION): ICD-10-CM

## 2019-03-04 PROCEDURE — 93224 XTRNL ECG REC UP TO 48 HRS: CPT | Performed by: INTERNAL MEDICINE

## 2019-03-07 DIAGNOSIS — R55 SYNCOPE, UNSPECIFIED SYNCOPE TYPE: ICD-10-CM

## 2019-03-07 LAB — EKG IMPRESSION: NORMAL

## 2019-03-28 ENCOUNTER — OFFICE VISIT (OUTPATIENT)
Dept: INTERNAL MEDICINE | Facility: MEDICAL CENTER | Age: 49
End: 2019-03-28
Payer: COMMERCIAL

## 2019-03-28 VITALS
HEART RATE: 75 BPM | DIASTOLIC BLOOD PRESSURE: 62 MMHG | OXYGEN SATURATION: 94 % | SYSTOLIC BLOOD PRESSURE: 98 MMHG | WEIGHT: 169.8 LBS | RESPIRATION RATE: 17 BRPM | TEMPERATURE: 97.7 F | BODY MASS INDEX: 30.56 KG/M2

## 2019-03-28 DIAGNOSIS — R55 SYNCOPE, UNSPECIFIED SYNCOPE TYPE: ICD-10-CM

## 2019-03-28 DIAGNOSIS — H81.13 BENIGN PAROXYSMAL POSITIONAL VERTIGO DUE TO BILATERAL VESTIBULAR DISORDER: ICD-10-CM

## 2019-03-28 DIAGNOSIS — E66.9 OBESITY (BMI 30.0-34.9): ICD-10-CM

## 2019-03-28 DIAGNOSIS — E03.9 HYPOTHYROIDISM (ACQUIRED): ICD-10-CM

## 2019-03-28 PROBLEM — E66.811 OBESITY (BMI 30.0-34.9): Status: ACTIVE | Noted: 2019-03-28

## 2019-03-28 PROCEDURE — 99213 OFFICE O/P EST LOW 20 MIN: CPT | Mod: GE | Performed by: INTERNAL MEDICINE

## 2019-03-28 ASSESSMENT — PAIN SCALES - GENERAL: PAINLEVEL: NO PAIN

## 2019-03-28 NOTE — PROGRESS NOTES
Established Patient    Shelby presents today with the following:    CC:   Chief Complaint   Patient presents with   • Vertigo     follow up         HPI:   Patient is a 48-year-old female with history of hypothyroidism, dizziness, who is here for follow-up.  Patient was seen on February 28, evaluated for syncope.  Orthostatic vitals were negative.  Patient has no history of seizures, palpitations.  She stated dizziness usually associated with nausea, but no vomiting, sometimes feels sweaty.  And comes at any time in the standing or sitting.  Occasionally experience when she moves from sitting to standing.  Holter monitor was ordered, and came back essentially normal.  Patient was also instructed to do Epley maneuver, and started on meclizine as needed.  According to patient symptoms have significantly improve on this conservative management.  She wants to continue doing this.  Nausea, and dizziness have significantly improved.  No headaches or blurry vision.  No extremity weakness no numbness or tingling sensation.  No history of cardiac disease.    ROS:  Constitutional: No fever, no chills,  Respiratory: No cough, no hemoptysis,  Cardiovascular: No chest pain, no palpitations, no orthopnea, no PND, no lower extremity swelling  GI  : No nausea, no vomiting, no abdominal pain, no diarrhea, no constipation, no hematochezia  : No dysuria, no urgency, no hesitancy, no nocturia, no frequency, no hematuria  Endocrine: No polyuria, no polydipsia,  Hematology: No easy bruisability, no bleeding  Musculoskeletal: No joint swelling, no joint redness,  Neuro: No seizures, no numbness, no tingling sensation, no extremity weakness, no change in vision, no hearing impairment  Psychiatry: no depression, no suicidal ideation        Patient Active Problem List    Diagnosis Date Noted   • Benign paroxysmal positional vertigo due to bilateral vestibular disorder 03/28/2019   • Obesity (BMI 30.0-34.9) 03/28/2019   • Syncope  02/28/2019   • Dermatofibroma of deltoid region, left 06/05/2018   • Lentigo 06/05/2018   • Angioma 06/05/2018   • Obesity (BMI 30-39.9) 02/07/2018   • Hiatal hernia 10/23/2017   • GERD without esophagitis 11/22/2016   • Primary insomnia 11/22/2016   • Hyperlipidemia, unspecified 11/22/2016   • Vitamin D deficiency 11/22/2016   • Dependence on nicotine from cigarettes 11/22/2016   • Mixed incontinence urge and stress 11/22/2016   • Hypothyroidism (acquired) 11/22/2016   • Healthcare maintenance 11/22/2016       Current Outpatient Prescriptions   Medication Sig Dispense Refill   • meclizine (ANTIVERT) 25 MG Tab Take 1 Tab by mouth 3 times a day as needed. 60 Tab 3   • levothyroxine (SYNTHROID) 125 MCG Tab Take 1 Tab by mouth Every morning on an empty stomach. 90 Tab 0   • Aspirin-Acetaminophen-Caffeine (PAMPRIN MAX PO) Take 1 Tab by mouth as needed.       No current facility-administered medications for this visit.          BP (!) 98/62 (BP Location: Left arm, Patient Position: Sitting, BP Cuff Size: Large adult)   Pulse 75   Temp 36.5 °C (97.7 °F) (Temporal)   Resp 17   Wt 77 kg (169 lb 12.8 oz)   LMP 03/01/2019 (Approximate)   SpO2 94%   Breastfeeding? No   BMI 30.56 kg/m²     Physical Exam   Constitutional:  oriented to person, place, and time. No distress.   Eyes: Pupils are equal, round, and reactive to light. No scleral icterus.  Neck: Neck supple. No thyromegaly present.   Cardiovascular: Normal rate, regular rhythm and normal heart sounds.  Exam reveals no gallop and no friction rub.  No murmur heard.  Pulmonary/Chest: Breath sounds normal. Chest wall is not dull to percussion.   Musculoskeletal:   no edema.   Lymphadenopathy: no cervical adenopathy  Neurological: alert and oriented to person, place, and time.   Skin: No cyanosis. Nails show no clubbing.    Note: I have reviewed all pertinent labs and diagnostic tests associated with this visit with specific comments listed under the assessment and  plan below    Assessment and Plan    1. Benign paroxysmal positional vertigo due to bilateral vestibular disorder  2. Syncope, unspecified syncope type  Patient states dizziness, and nausea have significantly improved on meclizine and Epley maneuver that she undertakes by herself.  She however states nausea and dizziness sometimes get worse when she is undergoing a place exercise  [place]-Hallpike maneuver was positive during the previous visit.  This is most likely BPPV versus vasovagal episodes.  Although patient's blood pressure noted to be 98/62, she is asymptomatic.  She was educated to keep hydration adequate  48-hour Holter monitor essentially normal  - Patient encouraged to continue Epley maneuver  -Continue meclizine as needed basis  -We will see patient in 5-6 weeks, and if symptoms continue to persist with near syncopal episodes and sweatiness, we would likely refer patient to cardiology for possible tilt test    3. Obesity (BMI 30.0-34.9)  Patient educated on diet, exercise     4. Hypothyroidism (acquired)  Currently asymptomatic, stable  Last TSH was April 2018  -Check TSH with reflex 2 days  -Continue Synthroid 125 MCG daily    Followup: Return in about 5 weeks (around 5/2/2019).      Signed by: Chaparro Cooper M.D.    A computerized dictation system may have been used for this note.    Despite review, there may be some spelling or grammatical errors.

## 2019-04-11 DIAGNOSIS — E03.9 HYPOTHYROIDISM (ACQUIRED): ICD-10-CM

## 2019-04-11 RX ORDER — LEVOTHYROXINE SODIUM 0.12 MG/1
125 TABLET ORAL
Qty: 90 TAB | Refills: 0 | Status: SHIPPED | OUTPATIENT
Start: 2019-04-11 | End: 2019-07-09 | Stop reason: SDUPTHER

## 2019-04-11 NOTE — TELEPHONE ENCOUNTER
Last seen: 03/28/19 by Dr. Cooper  Next appt: 05/07/19 with Dr. Cantu    Was the patient seen in the last year in this department? Yes   Does patient have an active prescription for medications requested? No   Received Request Via: Pharmacy

## 2019-04-28 ENCOUNTER — OFFICE VISIT (OUTPATIENT)
Dept: URGENT CARE | Facility: PHYSICIAN GROUP | Age: 49
End: 2019-04-28
Payer: COMMERCIAL

## 2019-04-28 VITALS
HEIGHT: 63 IN | OXYGEN SATURATION: 94 % | RESPIRATION RATE: 18 BRPM | WEIGHT: 168 LBS | BODY MASS INDEX: 29.77 KG/M2 | TEMPERATURE: 97.6 F | HEART RATE: 96 BPM | SYSTOLIC BLOOD PRESSURE: 120 MMHG | DIASTOLIC BLOOD PRESSURE: 68 MMHG

## 2019-04-28 DIAGNOSIS — R05.9 COUGH: ICD-10-CM

## 2019-04-28 DIAGNOSIS — J98.01 ACUTE BRONCHOSPASM: ICD-10-CM

## 2019-04-28 DIAGNOSIS — J06.9 ACUTE URI: Primary | ICD-10-CM

## 2019-04-28 LAB
FLUAV+FLUBV AG SPEC QL IA: NEGATIVE
INT CON NEG: NEGATIVE
INT CON POS: POSITIVE

## 2019-04-28 PROCEDURE — 87804 INFLUENZA ASSAY W/OPTIC: CPT | Performed by: PHYSICIAN ASSISTANT

## 2019-04-28 PROCEDURE — 99214 OFFICE O/P EST MOD 30 MIN: CPT | Performed by: PHYSICIAN ASSISTANT

## 2019-04-28 RX ORDER — CODEINE PHOSPHATE/GUAIFENESIN 10-100MG/5
10 LIQUID (ML) ORAL 4 TIMES DAILY PRN
Qty: 200 ML | Refills: 0 | Status: SHIPPED | OUTPATIENT
Start: 2019-04-28 | End: 2019-05-03

## 2019-04-28 RX ORDER — ALBUTEROL SULFATE 90 UG/1
2 AEROSOL, METERED RESPIRATORY (INHALATION) EVERY 4 HOURS PRN
Qty: 1 INHALER | Refills: 0 | Status: SHIPPED | OUTPATIENT
Start: 2019-04-28 | End: 2023-02-22

## 2019-04-28 RX ORDER — PREDNISONE 20 MG/1
20 TABLET ORAL DAILY
Qty: 5 TAB | Refills: 0 | Status: SHIPPED | OUTPATIENT
Start: 2019-04-28 | End: 2019-05-03

## 2019-04-28 RX ORDER — IPRATROPIUM BROMIDE AND ALBUTEROL SULFATE 2.5; .5 MG/3ML; MG/3ML
3 SOLUTION RESPIRATORY (INHALATION) ONCE
Status: COMPLETED | OUTPATIENT
Start: 2019-04-28 | End: 2019-04-28

## 2019-04-28 RX ADMIN — IPRATROPIUM BROMIDE AND ALBUTEROL SULFATE 3 ML: 2.5; .5 SOLUTION RESPIRATORY (INHALATION) at 14:00

## 2019-04-28 ASSESSMENT — ENCOUNTER SYMPTOMS
WHEEZING: 1
CHILLS: 0
HEADACHES: 1
SHORTNESS OF BREATH: 0
SORE THROAT: 1
COUGH: 1
MYALGIAS: 0
SPUTUM PRODUCTION: 1
FEVER: 0

## 2019-04-28 NOTE — PROGRESS NOTES
Subjective:      Shelby De Paz is a 48 y.o. female who presents with Cough (congestion, sore thorat, ear pain x 2 days)    PMH:  has a past medical history of Asthma; Breath shortness; Bronchitis (09/18/2017); Cold (09/18/2017); Dental disorder; Disorder of thyroid; Finger fracture; Heart burn; Hiatus hernia syndrome; Hyperlipidemia; Indigestion; Insomnia; Polycythemia; Right hand pain; Snoring; Stress incontinence; Tobacco abuse; Urinary incontinence; and Vitamin D deficiency.  MEDS:   Current Outpatient Prescriptions:   •  levothyroxine (SYNTHROID) 125 MCG Tab, Take 1 Tab by mouth Every morning on an empty stomach., Disp: 90 Tab, Rfl: 0  •  meclizine (ANTIVERT) 25 MG Tab, Take 1 Tab by mouth 3 times a day as needed., Disp: 60 Tab, Rfl: 3  •  Aspirin-Acetaminophen-Caffeine (PAMPRIN MAX PO), Take 1 Tab by mouth as needed., Disp: , Rfl:   ALLERGIES:   Allergies   Allergen Reactions   • Contrast Media With Iodine [Iodine] Itching and Swelling     Throat and mouth  Osp=[id number]   • Other Misc Unspecified     cats     SURGHX:   Past Surgical History:   Procedure Laterality Date   • NISSEN FUNDOPLICATION LAPAROSCOPIC  10/23/2017    Procedure: NISSEN FUNDOPLICATION LAPAROSCOPIC;  Surgeon: John H Ganser, M.D.;  Location: SURGERY [facility];  Service: General   • BLADDER SLING FEMALE  8/19/2016    Procedure: BLADDER SLING FEMALE FOR : transvaginal tape ;  Surgeon: Travis Yepez M.D.;  Location: SURGERY [facility];  Service:    • OTHER  2000    sinus surgey   • DENTAL EXTRACTION(S)     • OTHER      T&A     SOCHX:  reports that she quit smoking about 4 years ago. Her smoking use included Cigarettes. She has a 10.00 pack-year smoking history. She has never used smokeless tobacco. She reports that she drinks alcohol. She reports that she does not use drugs.  FH: Reviewed with patient, not pertinent to this visit.           Patient presents with:  Cough: congestion, sore , scratchy throat, ear pain x 2 days.  "otc cough medicine is not working well.           Cough   This is a new problem. The current episode started yesterday. The problem has been gradually worsening. The problem occurs every few minutes. The cough is productive of sputum. Associated symptoms include ear pain, headaches, nasal congestion, postnasal drip, a sore throat and wheezing. Pertinent negatives include no chest pain, chills, fever, myalgias or shortness of breath. Risk factors for lung disease include smoking/tobacco exposure. She has tried OTC cough suppressant for the symptoms. The treatment provided no relief. Her past medical history is significant for asthma, bronchitis and environmental allergies.       Review of Systems   Constitutional: Negative for chills and fever.   HENT: Positive for congestion, ear pain, postnasal drip and sore throat.    Respiratory: Positive for cough, sputum production and wheezing. Negative for shortness of breath.    Cardiovascular: Negative for chest pain.   Musculoskeletal: Negative for myalgias.   Neurological: Positive for headaches.   Endo/Heme/Allergies: Positive for environmental allergies.   All other systems reviewed and are negative.         Objective:     /68   Pulse 96   Temp 36.4 °C (97.6 °F)   Resp 18   Ht 1.588 m (5' 2.5\")   Wt 76.2 kg (168 lb)   SpO2 94%   BMI 30.24 kg/m²      Physical Exam   Constitutional: She is oriented to person, place, and time. She appears well-developed and well-nourished. No distress.   HENT:   Head: Normocephalic.   Right Ear: Tympanic membrane normal.   Left Ear: Tympanic membrane normal.   Nose: Mucosal edema and rhinorrhea present.   Mouth/Throat: Uvula is midline, oropharynx is clear and moist and mucous membranes are normal. Tonsils are 0 on the right. Tonsils are 0 on the left.   Tonsils are surgically absent   Eyes: Pupils are equal, round, and reactive to light. Conjunctivae and EOM are normal.   Neck: Normal range of motion. Neck supple. "   Cardiovascular: Normal rate, regular rhythm and normal heart sounds.    Pulmonary/Chest: Effort normal. No respiratory distress. She has wheezes. She has rhonchi. She has no rales.   Abdominal: Soft.   Musculoskeletal: Normal range of motion.   Lymphadenopathy:     She has no cervical adenopathy.   Neurological: She is alert and oriented to person, place, and time.   Skin: Skin is warm and dry.   Psychiatric: She has a normal mood and affect.   Nursing note and vitals reviewed.         Flu: neg    Pt states symptoms have improved after neb treatment, on re-eval wheezing has improved and air movement better throughout.        Assessment/Plan:     1. Acute URI  albuterol 108 (90 Base) MCG/ACT Aero Soln inhalation aerosol    guaifenesin-codeine (TUSSI-ORGANIDIN NR) 100-10 MG/5ML syrup    predniSONE (DELTASONE) 20 MG Tab   2. Cough  POCT Influenza A/B    ipratropium-albuterol (DUONEB) nebulizer solution    albuterol 108 (90 Base) MCG/ACT Aero Soln inhalation aerosol    guaifenesin-codeine (TUSSI-ORGANIDIN NR) 100-10 MG/5ML syrup    predniSONE (DELTASONE) 20 MG Tab   3. Acute bronchospasm  albuterol 108 (90 Base) MCG/ACT Aero Soln inhalation aerosol    guaifenesin-codeine (TUSSI-ORGANIDIN NR) 100-10 MG/5ML syrup    predniSONE (DELTASONE) 20 MG Tab     Discussed that I felt this was viral in nature. Did not see any evidence of a bacterial process. Discussed natural progression and sx care.    PT instructed not to drive or operate heavy machinery or drink alcohol while taking this medication because it contains either a narcotic or benzodiazepines which causes drowsiness. PT verbalized understanding of these instructions.     [facility] Aware web site evaluation: I have obtained and reviewed patient utilization report from [facility] pharmacy database prior to writing prescription for controlled substance.  No history of abuse.    PT should follow up with PCP in 1-2 days for re-evaluation if symptoms have not improved.   Discussed red flags and reasons to return to UC or ED.  Pt and/or family verbalized understanding of diagnosis and follow up instructions and was offered informational handout on diagnosis.  PT discharged.

## 2019-06-02 ENCOUNTER — HOSPITAL ENCOUNTER (EMERGENCY)
Facility: MEDICAL CENTER | Age: 49
End: 2019-06-02
Attending: EMERGENCY MEDICINE
Payer: COMMERCIAL

## 2019-06-02 VITALS
RESPIRATION RATE: 15 BRPM | TEMPERATURE: 96.9 F | BODY MASS INDEX: 30.47 KG/M2 | SYSTOLIC BLOOD PRESSURE: 102 MMHG | HEART RATE: 77 BPM | OXYGEN SATURATION: 94 % | DIASTOLIC BLOOD PRESSURE: 57 MMHG | HEIGHT: 62 IN | WEIGHT: 165.57 LBS

## 2019-06-02 DIAGNOSIS — S05.01XA ABRASION OF RIGHT CORNEA, INITIAL ENCOUNTER: ICD-10-CM

## 2019-06-02 PROCEDURE — 99284 EMERGENCY DEPT VISIT MOD MDM: CPT

## 2019-06-02 PROCEDURE — 700101 HCHG RX REV CODE 250

## 2019-06-02 RX ORDER — PROPARACAINE HYDROCHLORIDE 5 MG/ML
1 SOLUTION/ DROPS OPHTHALMIC ONCE
Status: COMPLETED | OUTPATIENT
Start: 2019-06-02 | End: 2019-06-02

## 2019-06-02 RX ORDER — POLYMYXIN B SULFATE AND TRIMETHOPRIM 1; 10000 MG/ML; [USP'U]/ML
1 SOLUTION OPHTHALMIC EVERY 4 HOURS
Qty: 10 ML | Refills: 0 | Status: SHIPPED | OUTPATIENT
Start: 2019-06-02 | End: 2019-06-09

## 2019-06-02 RX ORDER — PROPARACAINE HYDROCHLORIDE 5 MG/ML
SOLUTION/ DROPS OPHTHALMIC
Status: COMPLETED
Start: 2019-06-02 | End: 2019-06-02

## 2019-06-02 RX ADMIN — FLUORESCEIN SODIUM 1 STRIP: 0.6 STRIP OPHTHALMIC at 08:30

## 2019-06-02 RX ADMIN — PROPARACAINE HYDROCHLORIDE 1 DROP: 5 SOLUTION/ DROPS OPHTHALMIC at 08:30

## 2019-06-02 ASSESSMENT — PAIN SCALES - WONG BAKER: WONGBAKER_NUMERICALRESPONSE: HURTS A WHOLE LOT

## 2019-06-02 NOTE — ED PROVIDER NOTES
ED Provider Note    Scribed for Refugio Powell M.D. by Minerva Gary. 6/2/2019, 8:19 AM.    Primary care provider: Royer Cantu M.D.  Means of arrival: Private Vehicle  History obtained from: Patient  History limited by: None    CHIEF COMPLAINT  Chief Complaint   Patient presents with   • Eye Pain     right eye pain/redness started last wknd, worse yesterday   • Light Sensitivity   • Blurred Vision     HPI  Shelby De Paz is a 48 y.o. female who presents to the Emergency Department with right eye pain onset 1 week, but worsening yesterday. Patient states her pain feels like it is located behind her eye. She thought she had something in her eye therefore has been flushing her eye out with water with no relief. She does wear contacts and reports noticing her contact lens was torn when she removed them. Associated symptoms include eye redness, photophobia, and slight blurred vision. Patient goes to [facility] and has seen multiple ophthalmologists in the past. Denies foreign body. She reports no pertinent past medical history.     REVIEW OF SYSTEMS  Pertinent positives include eye pain, eye redness, blurred vision, photophobia. Pertinent negatives include no foreign body. See HPI for further details.    PAST MEDICAL HISTORY   has a past medical history of Asthma; Breath shortness; Bronchitis (09/18/2017); Cold (09/18/2017); Dental disorder; Disorder of thyroid; Finger fracture; Heart burn; Hiatus hernia syndrome; Hyperlipidemia; Indigestion; Insomnia; Polycythemia; Right hand pain; Snoring; Stress incontinence; Tobacco abuse; Urinary incontinence; and Vitamin D deficiency.    SURGICAL HISTORY   has a past surgical history that includes dental extraction(s); bladder sling female (8/19/2016); other (2000); other; and nissen fundoplication laparoscopic (10/23/2017).    SOCIAL HISTORY  Social History   Substance Use Topics   • Smoking status: Former Smoker     Packs/day: 0.50     Years: 20.00     Types:  "Cigarettes     Quit date: 1/1/2015   • Smokeless tobacco: Never Used   • Alcohol use Yes      Comment: 5-10 per month      History   Drug Use No       FAMILY HISTORY  Family History   Problem Relation Age of Onset   • Heart Attack Mother 57   • Heart Attack Unknown 52        GF       CURRENT MEDICATIONS  Current Outpatient Prescriptions:   •  albuterol 108 (90 Base) MCG/ACT Aero Soln inhalation aerosol, Inhale 2 Puffs by mouth every four hours as needed., Disp: 1 Inhaler, Rfl: 0  •  levothyroxine (SYNTHROID) 125 MCG Tab, Take 1 Tab by mouth Every morning on an empty stomach., Disp: 90 Tab, Rfl: 0  •  meclizine (ANTIVERT) 25 MG Tab, Take 1 Tab by mouth 3 times a day as needed., Disp: 60 Tab, Rfl: 3  •  Aspirin-Acetaminophen-Caffeine (PAMPRIN MAX PO), Take 1 Tab by mouth as needed., Disp: , Rfl:     ALLERGIES  Allergies   Allergen Reactions   • Contrast Media With Iodine [Iodine] Itching and Swelling     Throat and mouth  Ttv=[id number]   • Other Misc Unspecified     cats       PHYSICAL EXAM  VITAL SIGNS: /68   Pulse 92   Temp 36.1 °C (96.9 °F) (Temporal)   Resp 15   Ht 1.575 m (5' 2\")   Wt 75.1 kg (165 lb 9.1 oz)   SpO2 93%   BMI 30.28 kg/m²     Constitutional: Well developed, Well nourished, No acute distress, Non-toxic appearance.   HENT: Normocephalic, Atraumatic  Eyes: Injected conjunctival on the right. Corneal defect at 3 o'clock. No foreign body visualized. EOMI. Pupils are equal and reactive but she has consensual photophobia. It does look like she has some flare but no cells were seen. Tonicity 10/10  Cardiovascular: Regular pulse  Lungs: No respiratory distress  Skin: Warm, Dry, no rash  Extremities: No edema  Neurologic: Alert, appropriate, follows commands  Psychiatric: Affect normal      DIAGNOSTIC STUDIES / PROCEDURES    Slit Lamp Eye Exam   Administered Proparacaine HCL 0.5%  Results: Defect in the cornea. No ulcer is visualized.   See physical exam above.     COURSE & MEDICAL DECISION " MAKING  Nursing notes, VS, PMSFHx reviewed in chart.     8:19 AM - Patient seen and examined at bedside. Administered proparacaine drops and performed slit lamp exam at this time. Patient reports mild relief after treated with Proparacaine drops. Other than a corneal defect on the right, no ulcer or foreign body was visualized. Symptoms are consistent with Iritis. Discussed treatment with the patient which includes steroid drops however, I am unable to initiate this in the ED therefore will contact her ophthalmologist to initiate this. She will be discharged home with antibiotic eye drops. Patient verbalized her understanding and agrees to the discharge instructions.     8:40 AM - Paged [facility].     9:18 AM - Consulted with Dr. Mccloud (Ophthalmology) who says to start her on antibiotic eye drops and have her follow up in the office on Monday.     Decision Making:  This is a 48 y.o. year old female who presents with eye pain on the right side.  She does have photophobia which is not relieved with topical anesthetic.  I do have a suspicion for iritis in addition to what appears to be a corneal defect/laceration or abrasion.  Case discussed with the on-call ophthalmologist for [facility].  She will be followed up with tomorrow in their office.  Otherwise we will place her on antibiotic eyedrops.  At this time I see no foreign body, there is no evidence of acute glaucoma.    The patient will return for new or worsening symptoms and is stable at the time of discharge.    The patient is referred to a primary physician for blood pressure management, diabetic screening, and for all other preventative health concerns.      DISPOSITION:  Patient will be discharged home in stable condition.    FOLLOW UP:  [facility]  [facility] [address] [address]  [phone]  In 1 day        OUTPATIENT MEDICATIONS:  New Prescriptions    POLYMIXIN-TRIMETHOPRIM (POLYTRIM)  25407-1.1 UNIT/ML-% SOLUTION    Place 1 Drop in both eyes every 4 hours for 7 days.         FINAL IMPRESSION  Performed slit lamp eye exam  1. Abrasion of right cornea, initial encounter          Minerva CONCEPCION (Scribe), am scribing for, and in the presence of, Refugio Powell M.D..    Electronically signed by: Minerva Gary (Scribe), 6/2/2019    IRefugio M.D. personally performed the services described in this documentation, as scribed by Minerva Gary in my presence, and it is both accurate and complete. E    The note accurately reflects work and decisions made by me.  Refugio Powell  6/2/2019  9:38 AM

## 2019-06-02 NOTE — ED NOTES
Pt. Provided DC instructions and understood all MD recommendations. Pt provided Education. Pt. Ambulatory with steady gait upon leaving the ED.

## 2019-06-02 NOTE — ED TRIAGE NOTES
Pt ambulatory to triage c/o right eye pain/redness with slight blurred vision & light sensitivity.  Pt states the pain started last weekend & she noticed a tear in her contact lens.  Pt removed that contact, flushed her eye at that time & reports decreased pain for 2-3 days.  Yesterday, pain returned & was much worse.

## 2019-07-07 ENCOUNTER — HOSPITAL ENCOUNTER (OUTPATIENT)
Dept: RADIOLOGY | Facility: MEDICAL CENTER | Age: 49
End: 2019-07-07
Attending: FAMILY MEDICINE
Payer: COMMERCIAL

## 2019-07-07 ENCOUNTER — OFFICE VISIT (OUTPATIENT)
Dept: URGENT CARE | Facility: PHYSICIAN GROUP | Age: 49
End: 2019-07-07
Payer: COMMERCIAL

## 2019-07-07 VITALS
TEMPERATURE: 98.3 F | OXYGEN SATURATION: 96 % | SYSTOLIC BLOOD PRESSURE: 108 MMHG | HEIGHT: 62 IN | RESPIRATION RATE: 16 BRPM | DIASTOLIC BLOOD PRESSURE: 68 MMHG | BODY MASS INDEX: 30.36 KG/M2 | HEART RATE: 89 BPM | WEIGHT: 165 LBS

## 2019-07-07 DIAGNOSIS — S90.32XA CONTUSION OF LEFT HEEL, INITIAL ENCOUNTER: ICD-10-CM

## 2019-07-07 DIAGNOSIS — S99.922A INJURY OF HEEL, LEFT, INITIAL ENCOUNTER: ICD-10-CM

## 2019-07-07 PROCEDURE — 73630 X-RAY EXAM OF FOOT: CPT | Mod: LT

## 2019-07-07 PROCEDURE — 99213 OFFICE O/P EST LOW 20 MIN: CPT | Performed by: FAMILY MEDICINE

## 2019-07-07 ASSESSMENT — ENCOUNTER SYMPTOMS
VOMITING: 0
FEVER: 0
SHORTNESS OF BREATH: 0
FOCAL WEAKNESS: 0

## 2019-07-07 NOTE — PROGRESS NOTES
Subjective:     Shelby De Paz is a 48 y.o. female who presents for Foot Swelling (left foot injury, jumped out of the back of a pickup )    HPI  Pt presents for evaluation of a new problem   Pt with left foot pain after jumping out of the back of the truck   Happened 2 days ago   Has pain in the heel   Pain stays localized most of the time, occasionally radiates up back of heel   Pain is constant and worse when putting any weight on it   No associated bruising   No prior injury to this foot     Review of Systems   Constitutional: Negative for fever.   Respiratory: Negative for shortness of breath.    Cardiovascular: Negative for chest pain.   Gastrointestinal: Negative for vomiting.   Skin: Negative for rash.   Neurological: Negative for focal weakness.     PMH:  has a past medical history of Asthma; Breath shortness; Bronchitis (09/18/2017); Cold (09/18/2017); Dental disorder; Disorder of thyroid; Finger fracture; Heart burn; Hiatus hernia syndrome; Hyperlipidemia; Indigestion; Insomnia; Polycythemia; Right hand pain; Snoring; Stress incontinence; Tobacco abuse; Urinary incontinence; and Vitamin D deficiency.  MEDS:   Current Outpatient Prescriptions:   •  levothyroxine (SYNTHROID) 125 MCG Tab, Take 1 Tab by mouth Every morning on an empty stomach., Disp: 90 Tab, Rfl: 0  •  albuterol 108 (90 Base) MCG/ACT Aero Soln inhalation aerosol, Inhale 2 Puffs by mouth every four hours as needed., Disp: 1 Inhaler, Rfl: 0  •  meclizine (ANTIVERT) 25 MG Tab, Take 1 Tab by mouth 3 times a day as needed. (Patient not taking: Reported on 7/7/2019), Disp: 60 Tab, Rfl: 3  •  Aspirin-Acetaminophen-Caffeine (PAMPRIN MAX PO), Take 1 Tab by mouth as needed., Disp: , Rfl:   ALLERGIES:   Allergies   Allergen Reactions   • Contrast Media With Iodine [Iodine] Itching and Swelling     Throat and mouth  Zle=2490   • Other Misc Unspecified     cats     SURGHX:   Past Surgical History:   Procedure Laterality Date   • NISSEN  "FUNDOPLICATION LAPAROSCOPIC  10/23/2017    Procedure: NISSEN FUNDOPLICATION LAPAROSCOPIC;  Surgeon: John H Ganser, M.D.;  Location: SURGERY [facility] [place];  Service: General   • BLADDER SLING FEMALE  8/19/2016    Procedure: BLADDER SLING FEMALE FOR : transvaginal tape ;  Surgeon: Travis Yepez M.D.;  Location: SURGERY [facility] [place];  Service:    • OTHER  2000    sinus surgey   • DENTAL EXTRACTION(S)     • OTHER      T&A     SOCHX:  reports that she quit smoking about 4 years ago. Her smoking use included Cigarettes. She has a 10.00 pack-year smoking history. She has never used smokeless tobacco. She reports that she drinks alcohol. She reports that she does not use drugs.  FH: Family history was reviewed, not contributing to acute injury      Objective:   /68 (BP Location: Right arm, Patient Position: Sitting, BP Cuff Size: Adult)   Pulse 89   Temp 36.8 °C (98.3 °F) (Temporal)   Resp 16   Ht 1.575 m (5' 2\")   Wt 74.8 kg (165 lb)   LMP 06/23/2019 (Within Days)   SpO2 96%   BMI 30.18 kg/m²     Physical Exam   Constitutional: She is oriented to person, place, and time. She appears well-developed and well-nourished. No distress.   HENT:   Head: Normocephalic and atraumatic.   Musculoskeletal:   Left ankle/foot:  Appearance - No bruising, erythema, or deformity appreciated  Palpation - +TTP along the plantar and lateral aspect of heel.  No TTP along medial malleolus or deltoid ligament.  No TTP of lateral malleolus, ATFL, CFL, or PTFL.  No TTP along midfoot, base of the 5th metatarsal, MTP joints, or toes.   ROM - FROM throughout  Neurovascular - 2+ dorsalis pedis and posterior tibial.  Sensation intact and equal bilaterally  Gait - antalgic   Neurological: She is alert and oriented to person, place, and time. No sensory deficit.   Skin: Skin is warm and dry. She is not diaphoretic. No erythema.   Psychiatric: She has a normal mood and affect. Her behavior is normal. Judgment and thought content " normal.       Assessment/Plan:   Assessment    1. Contusion of left heel, initial encounter  Pt with contusion of left heel.  X-ray does not show acute fracture.  Advised NSAIDs for symptom relief.  Will start using heel cup to pad the heel when walking in order to allow the area to heal.  Advised that this can take a few weeks to heal fully.  F/U as needed if not improving.  - DX-FOOT-COMPLETE 3+ LEFT; Future

## 2019-07-09 DIAGNOSIS — E03.9 HYPOTHYROIDISM (ACQUIRED): ICD-10-CM

## 2019-07-09 RX ORDER — LEVOTHYROXINE SODIUM 0.12 MG/1
125 TABLET ORAL
Qty: 30 TAB | Refills: 0 | Status: SHIPPED | OUTPATIENT
Start: 2019-07-09 | End: 2022-07-29

## 2020-02-25 ENCOUNTER — HOSPITAL ENCOUNTER (OUTPATIENT)
Dept: LAB | Facility: MEDICAL CENTER | Age: 50
End: 2020-02-25
Attending: PHYSICIAN ASSISTANT
Payer: COMMERCIAL

## 2020-02-25 PROCEDURE — 88175 CYTOPATH C/V AUTO FLUID REDO: CPT

## 2020-02-25 PROCEDURE — 87624 HPV HI-RISK TYP POOLED RSLT: CPT

## 2020-02-27 LAB
CYTOLOGY REG CYTOL: NORMAL
HPV HR 12 DNA CVX QL NAA+PROBE: NEGATIVE
HPV16 DNA SPEC QL NAA+PROBE: NEGATIVE
HPV18 DNA SPEC QL NAA+PROBE: NEGATIVE
SPECIMEN SOURCE: NORMAL

## 2021-12-13 NOTE — TELEPHONE ENCOUNTER
Last seen: 09/20/2021 by Dr. Ferris  Next appt: None    Was the patient seen in the last year in this department? Yes   Does patient have an active prescription for medications requested? No   Received Request Via: Pharmacy

## 2021-12-16 RX ORDER — LEVOTHYROXINE SODIUM 0.15 MG/1
TABLET ORAL
Qty: 90 TABLET | Refills: 1 | Status: SHIPPED | OUTPATIENT
Start: 2021-12-16 | End: 2022-07-21 | Stop reason: SDUPTHER

## 2022-06-22 NOTE — TELEPHONE ENCOUNTER
Levothyroxine Refill    Last seen: 9/20/21 by Dr. Ferris  Next appt: None    Was the patient seen in the last year in this department? Yes   Does patient have an active prescription for medications requested? No   Received Request Via: Pharmacy

## 2022-07-21 DIAGNOSIS — E03.9 HYPOTHYROIDISM, UNSPECIFIED TYPE: ICD-10-CM

## 2022-07-21 NOTE — TELEPHONE ENCOUNTER
Last seen: 09.20.2021 by Dr. Ferris  Next appt: none    Was the patient seen in the last year in this department? Yes   Does patient have an active prescription for medications requested? No   Received Request Via: Pharmacy

## 2022-07-28 NOTE — TELEPHONE ENCOUNTER
Patient called stating that she has been out of medication for a week now. Please give her a call when the medication has been refilled.

## 2022-07-29 RX ORDER — LEVOTHYROXINE SODIUM 0.15 MG/1
150 TABLET ORAL
Qty: 90 TABLET | Refills: 1 | Status: SHIPPED | OUTPATIENT
Start: 2022-07-29 | End: 2023-02-22 | Stop reason: SDUPTHER

## 2022-07-29 RX ORDER — LEVOTHYROXINE SODIUM 0.15 MG/1
TABLET ORAL
Qty: 90 TABLET | Refills: 1 | OUTPATIENT
Start: 2022-07-29

## 2022-07-29 NOTE — TELEPHONE ENCOUNTER
Refill sent.  It has been more than 6 months since last Thyroid labs . I am also ordering labs , please ask patient to get the labs done in next 2-3 weeks. Thank you

## 2022-11-03 ENCOUNTER — OFFICE VISIT (OUTPATIENT)
Dept: URGENT CARE | Facility: PHYSICIAN GROUP | Age: 52
End: 2022-11-03

## 2022-11-03 ENCOUNTER — HOSPITAL ENCOUNTER (OUTPATIENT)
Facility: MEDICAL CENTER | Age: 52
End: 2022-11-03
Attending: STUDENT IN AN ORGANIZED HEALTH CARE EDUCATION/TRAINING PROGRAM
Payer: COMMERCIAL

## 2022-11-03 VITALS
DIASTOLIC BLOOD PRESSURE: 58 MMHG | BODY MASS INDEX: 29.44 KG/M2 | WEIGHT: 160 LBS | TEMPERATURE: 97.4 F | OXYGEN SATURATION: 98 % | HEIGHT: 62 IN | SYSTOLIC BLOOD PRESSURE: 126 MMHG | RESPIRATION RATE: 16 BRPM | HEART RATE: 60 BPM

## 2022-11-03 DIAGNOSIS — R30.0 DYSURIA: ICD-10-CM

## 2022-11-03 LAB
APPEARANCE UR: CLEAR
BILIRUB UR STRIP-MCNC: NEGATIVE MG/DL
COLOR UR AUTO: YELLOW
GLUCOSE UR STRIP.AUTO-MCNC: NEGATIVE MG/DL
KETONES UR STRIP.AUTO-MCNC: NEGATIVE MG/DL
LEUKOCYTE ESTERASE UR QL STRIP.AUTO: NORMAL
NITRITE UR QL STRIP.AUTO: NEGATIVE
PH UR STRIP.AUTO: 7 [PH] (ref 5–8)
PROT UR QL STRIP: NEGATIVE MG/DL
RBC UR QL AUTO: NEGATIVE
SP GR UR STRIP.AUTO: 1.01
UROBILINOGEN UR STRIP-MCNC: 0.2 MG/DL

## 2022-11-03 PROCEDURE — 87077 CULTURE AEROBIC IDENTIFY: CPT

## 2022-11-03 PROCEDURE — 87480 CANDIDA DNA DIR PROBE: CPT

## 2022-11-03 PROCEDURE — 87510 GARDNER VAG DNA DIR PROBE: CPT

## 2022-11-03 PROCEDURE — 87186 SC STD MICRODIL/AGAR DIL: CPT

## 2022-11-03 PROCEDURE — 87086 URINE CULTURE/COLONY COUNT: CPT

## 2022-11-03 PROCEDURE — 81002 URINALYSIS NONAUTO W/O SCOPE: CPT | Performed by: STUDENT IN AN ORGANIZED HEALTH CARE EDUCATION/TRAINING PROGRAM

## 2022-11-03 PROCEDURE — 87660 TRICHOMONAS VAGIN DIR PROBE: CPT

## 2022-11-03 PROCEDURE — 99213 OFFICE O/P EST LOW 20 MIN: CPT | Performed by: STUDENT IN AN ORGANIZED HEALTH CARE EDUCATION/TRAINING PROGRAM

## 2022-11-03 NOTE — PROGRESS NOTES
Subjective:   CHIEF COMPLAINT  Chief Complaint   Patient presents with    UTI     Burning, discomfort, polyuria, x 2 weeks       HPI  Shelby De Paz is a 52 y.o. female who presents with a chief complaint of dysuria and urgency x2 weeks.  She has tried cranberry juice and pushing the fluids which have not helped.  She is not currently taking AZO.  Denies associated symptoms of urinary frequency, abnormal vaginal discharge, odor, vaginal pruritus or flank pain.  No fevers, chills, nausea or vomiting.    REVIEW OF SYSTEMS  General: no fever or chills  GI: no nausea or vomiting  See HPI for further details.    PAST MEDICAL HISTORY  Patient Active Problem List    Diagnosis Date Noted    Benign paroxysmal positional vertigo due to bilateral vestibular disorder 03/28/2019    Obesity (BMI 30.0-34.9) 03/28/2019    Syncope 02/28/2019    Dermatofibroma of deltoid region, left 06/05/2018    Lentigo 06/05/2018    Angioma 06/05/2018    Obesity (BMI 30-39.9) 02/07/2018    Hiatal hernia 10/23/2017    GERD without esophagitis 11/22/2016    Primary insomnia 11/22/2016    Hyperlipidemia, unspecified 11/22/2016    Vitamin D deficiency 11/22/2016    Dependence on nicotine from cigarettes 11/22/2016    Mixed incontinence urge and stress 11/22/2016    Hypothyroidism (acquired) 11/22/2016    Healthcare maintenance 11/22/2016       SURGICAL HISTORY   has a past surgical history that includes dental extraction(s); bladder sling female (8/19/2016); other (2000); other; and nissen fundoplication laparoscopic (10/23/2017).    ALLERGIES  Allergies   Allergen Reactions    Contrast Media With Iodine [Iodine] Itching and Swelling     Throat and mouth  Pxd=9807    Other Misc Unspecified     cats       CURRENT MEDICATIONS  Home Medications       Reviewed by Vesta Mcfarland, Med Ass't (Medical Assistant) on 11/03/22 at 1127  Med List Status: <None>     Medication Last Dose Status   albuterol 108 (90 Base) MCG/ACT Aero Soln inhalation aerosol  "Not Taking Active   Aspirin-Acetaminophen-Caffeine (PAMPRIN MAX PO) Not Taking Active   levothyroxine (SYNTHROID) 150 MCG Tab Taking Active   meclizine (ANTIVERT) 25 MG Tab Not Taking Active                    SOCIAL HISTORY  Social History     Tobacco Use    Smoking status: Former     Packs/day: 0.50     Years: 20.00     Pack years: 10.00     Types: Cigarettes     Quit date: 2015     Years since quittin.8    Smokeless tobacco: Never   Substance and Sexual Activity    Alcohol use: Yes     Comment: 5-10 per month    Drug use: No    Sexual activity: Not on file     Comment: Works at [employer]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        FAMILY HISTORY  Family History   Problem Relation Age of Onset    Heart Attack Mother 57    Heart Attack Unknown 52        GF          Objective:   PHYSICAL EXAM  VITAL SIGNS: /58 (BP Location: Right arm, Patient Position: Sitting, BP Cuff Size: Adult)   Pulse 60   Temp 36.3 °C (97.4 °F) (Temporal)   Resp 16   Ht 1.575 m (5' 2\")   Wt 72.6 kg (160 lb)   SpO2 98%   BMI 29.26 kg/m²     Gen: no acute distress, normal voice  Skin: dry, intact, moist mucosal membranes  Lungs: CTAB w/ symmetric expansion  CV: RRR w/o murmurs or clicks  : No CVAT bilaterally  Psych: normal affect, normal judgement, alert, awake    UA: Trace leukocytes.  No blood or nitrates.    Assessment/Plan:     1. Dysuria  POCT Urinalysis    VAGINAL PATHOGENS DNA PANEL    URINE CULTURE(NEW)      Possible BV versus atrophic vaginitis.  UA unremarkable.  - Ordered urine culture  - Ordered vaginal pathogens.  Contact patient at [phone] only if there is a positive test results and needs started on medications.  Otherwise she will view the results through [employer].  Okay to leave voicemail. Okay to leave voicemail.  - Recommended following up with OB/GYN  - Return to urgent care any new/worsening symptoms or further questions or concerns.  Patient understood everything discussed.  All questions were " answered.    Differential diagnosis, natural history, supportive care, and indications for immediate follow-up discussed. All questions answered. Patient agrees with the plan of care.    Follow-up as needed if symptoms worsen or fail to improve to PCP, Urgent care or Emergency Room.    Please note that this dictation was created using voice recognition software. I have made a reasonable attempt to correct obvious errors, but I expect that there are errors of grammar and possibly content that I did not discover before finalizing the note.

## 2022-11-04 DIAGNOSIS — R30.0 DYSURIA: ICD-10-CM

## 2022-11-04 LAB
CANDIDA DNA VAG QL PROBE+SIG AMP: NEGATIVE
G VAGINALIS DNA VAG QL PROBE+SIG AMP: NEGATIVE
T VAGINALIS DNA VAG QL PROBE+SIG AMP: NEGATIVE

## 2022-11-06 LAB
BACTERIA UR CULT: ABNORMAL
BACTERIA UR CULT: ABNORMAL
SIGNIFICANT IND 70042: ABNORMAL
SITE SITE: ABNORMAL
SOURCE SOURCE: ABNORMAL

## 2022-11-08 ENCOUNTER — TELEPHONE (OUTPATIENT)
Dept: URGENT CARE | Facility: PHYSICIAN GROUP | Age: 52
End: 2022-11-08
Payer: COMMERCIAL

## 2022-11-08 DIAGNOSIS — N30.00 ACUTE CYSTITIS WITHOUT HEMATURIA: ICD-10-CM

## 2022-11-08 RX ORDER — NITROFURANTOIN 25; 75 MG/1; MG/1
100 CAPSULE ORAL 2 TIMES DAILY
Qty: 10 CAPSULE | Refills: 0 | Status: SHIPPED | OUTPATIENT
Start: 2022-11-08 | End: 2022-11-13

## 2022-11-08 NOTE — TELEPHONE ENCOUNTER
Urine culture returned positive.  I attempted to contact the patient but was unable to speak with her directly therefore I left a voicemail informing her of the positive urine culture.  A prescription for Macrobid was sent to her pharmacy.

## 2022-12-02 ENCOUNTER — HOSPITAL ENCOUNTER (OUTPATIENT)
Facility: MEDICAL CENTER | Age: 52
End: 2022-12-02
Payer: COMMERCIAL

## 2022-12-02 ENCOUNTER — HOSPITAL ENCOUNTER (OUTPATIENT)
Dept: LAB | Facility: MEDICAL CENTER | Age: 52
End: 2022-12-02

## 2022-12-02 PROCEDURE — 88175 CYTOPATH C/V AUTO FLUID REDO: CPT

## 2022-12-03 LAB — CYTOLOGY REG CYTOL: NORMAL

## 2023-02-22 ENCOUNTER — OFFICE VISIT (OUTPATIENT)
Dept: MEDICAL GROUP | Facility: PHYSICIAN GROUP | Age: 53
End: 2023-02-22
Payer: COMMERCIAL

## 2023-02-22 ENCOUNTER — HOSPITAL ENCOUNTER (OUTPATIENT)
Facility: MEDICAL CENTER | Age: 53
End: 2023-02-22
Attending: PHYSICIAN ASSISTANT
Payer: COMMERCIAL

## 2023-02-22 VITALS
OXYGEN SATURATION: 96 % | HEART RATE: 74 BPM | RESPIRATION RATE: 16 BRPM | BODY MASS INDEX: 31.83 KG/M2 | HEIGHT: 62 IN | SYSTOLIC BLOOD PRESSURE: 104 MMHG | TEMPERATURE: 97.8 F | DIASTOLIC BLOOD PRESSURE: 66 MMHG | WEIGHT: 173 LBS

## 2023-02-22 DIAGNOSIS — N39.41 URGE INCONTINENCE: ICD-10-CM

## 2023-02-22 DIAGNOSIS — R53.83 OTHER FATIGUE: ICD-10-CM

## 2023-02-22 DIAGNOSIS — R30.0 DYSURIA: ICD-10-CM

## 2023-02-22 DIAGNOSIS — Z12.11 COLON CANCER SCREENING: ICD-10-CM

## 2023-02-22 DIAGNOSIS — E03.9 HYPOTHYROIDISM (ACQUIRED): ICD-10-CM

## 2023-02-22 DIAGNOSIS — K44.9 HIATAL HERNIA: ICD-10-CM

## 2023-02-22 DIAGNOSIS — E55.9 VITAMIN D DEFICIENCY: ICD-10-CM

## 2023-02-22 DIAGNOSIS — Z12.31 ENCOUNTER FOR SCREENING MAMMOGRAM FOR MALIGNANT NEOPLASM OF BREAST: ICD-10-CM

## 2023-02-22 DIAGNOSIS — K21.9 GERD WITHOUT ESOPHAGITIS: ICD-10-CM

## 2023-02-22 DIAGNOSIS — E78.5 HYPERLIPIDEMIA, UNSPECIFIED HYPERLIPIDEMIA TYPE: ICD-10-CM

## 2023-02-22 DIAGNOSIS — F51.01 PRIMARY INSOMNIA: ICD-10-CM

## 2023-02-22 DIAGNOSIS — N95.1 PERIMENOPAUSE: ICD-10-CM

## 2023-02-22 DIAGNOSIS — E66.09 CLASS 1 OBESITY DUE TO EXCESS CALORIES WITHOUT SERIOUS COMORBIDITY WITH BODY MASS INDEX (BMI) OF 31.0 TO 31.9 IN ADULT: ICD-10-CM

## 2023-02-22 PROBLEM — H81.13 BENIGN PAROXYSMAL VERTIGO, BILATERAL: Status: ACTIVE | Noted: 2019-03-28

## 2023-02-22 PROBLEM — E66.9 OBESITY (BMI 30.0-34.9): Status: RESOLVED | Noted: 2019-03-28 | Resolved: 2023-02-22

## 2023-02-22 PROBLEM — D18.00 ANGIOMA: Status: RESOLVED | Noted: 2018-06-05 | Resolved: 2023-02-22

## 2023-02-22 PROBLEM — R55 SYNCOPE: Status: RESOLVED | Noted: 2019-02-28 | Resolved: 2023-02-22

## 2023-02-22 PROBLEM — L81.4 LENTIGO: Status: RESOLVED | Noted: 2018-06-05 | Resolved: 2023-02-22

## 2023-02-22 PROBLEM — E66.811 OBESITY (BMI 30.0-34.9): Status: RESOLVED | Noted: 2019-03-28 | Resolved: 2023-02-22

## 2023-02-22 PROBLEM — E66.811 CLASS 1 OBESITY DUE TO EXCESS CALORIES WITHOUT SERIOUS COMORBIDITY WITH BODY MASS INDEX (BMI) OF 31.0 TO 31.9 IN ADULT: Status: ACTIVE | Noted: 2018-02-07

## 2023-02-22 PROBLEM — H81.13 BENIGN PAROXYSMAL POSITIONAL VERTIGO DUE TO BILATERAL VESTIBULAR DISORDER: Status: RESOLVED | Noted: 2019-03-28 | Resolved: 2023-02-22

## 2023-02-22 PROBLEM — H81.13 BENIGN PAROXYSMAL VERTIGO, BILATERAL: Status: RESOLVED | Noted: 2019-03-28 | Resolved: 2023-02-22

## 2023-02-22 LAB
APPEARANCE UR: NORMAL
BILIRUB UR STRIP-MCNC: NEGATIVE MG/DL
COLOR UR AUTO: YELLOW
GLUCOSE UR STRIP.AUTO-MCNC: NEGATIVE MG/DL
KETONES UR STRIP.AUTO-MCNC: NEGATIVE MG/DL
LEUKOCYTE ESTERASE UR QL STRIP.AUTO: NEGATIVE
NITRITE UR QL STRIP.AUTO: NEGATIVE
PH UR STRIP.AUTO: 7 [PH] (ref 5–8)
PROT UR QL STRIP: NEGATIVE MG/DL
RBC UR QL AUTO: NEGATIVE
SP GR UR STRIP.AUTO: 1.02
UROBILINOGEN UR STRIP-MCNC: 0.2 MG/DL

## 2023-02-22 PROCEDURE — 81002 URINALYSIS NONAUTO W/O SCOPE: CPT | Performed by: PHYSICIAN ASSISTANT

## 2023-02-22 PROCEDURE — 99214 OFFICE O/P EST MOD 30 MIN: CPT | Performed by: PHYSICIAN ASSISTANT

## 2023-02-22 PROCEDURE — 87186 SC STD MICRODIL/AGAR DIL: CPT

## 2023-02-22 PROCEDURE — 87077 CULTURE AEROBIC IDENTIFY: CPT

## 2023-02-22 PROCEDURE — 87086 URINE CULTURE/COLONY COUNT: CPT

## 2023-02-22 RX ORDER — SIMVASTATIN 40 MG
1 TABLET ORAL DAILY
COMMUNITY
End: 2023-02-22

## 2023-02-22 RX ORDER — LEVOTHYROXINE SODIUM 0.15 MG/1
150 TABLET ORAL
Qty: 90 TABLET | Refills: 1 | Status: SHIPPED | OUTPATIENT
Start: 2023-02-22 | End: 2023-05-24

## 2023-02-22 RX ORDER — PAROXETINE 7.5 MG/1
1 CAPSULE ORAL
COMMUNITY
Start: 2022-12-05 | End: 2023-02-22

## 2023-02-22 RX ORDER — ESTRADIOL 4 UG/1
1 INSERT VAGINAL
COMMUNITY
Start: 2023-01-10 | End: 2023-05-24

## 2023-02-22 SDOH — ECONOMIC STABILITY: INCOME INSECURITY: IN THE LAST 12 MONTHS, WAS THERE A TIME WHEN YOU WERE NOT ABLE TO PAY THE MORTGAGE OR RENT ON TIME?: NO

## 2023-02-22 SDOH — ECONOMIC STABILITY: HOUSING INSECURITY: IN THE LAST 12 MONTHS, HOW MANY PLACES HAVE YOU LIVED?: 1

## 2023-02-22 SDOH — HEALTH STABILITY: PHYSICAL HEALTH: ON AVERAGE, HOW MANY DAYS PER WEEK DO YOU ENGAGE IN MODERATE TO STRENUOUS EXERCISE (LIKE A BRISK WALK)?: 0 DAYS

## 2023-02-22 SDOH — ECONOMIC STABILITY: TRANSPORTATION INSECURITY
IN THE PAST 12 MONTHS, HAS THE LACK OF TRANSPORTATION KEPT YOU FROM MEDICAL APPOINTMENTS OR FROM GETTING MEDICATIONS?: NO

## 2023-02-22 SDOH — HEALTH STABILITY: PHYSICAL HEALTH: ON AVERAGE, HOW MANY MINUTES DO YOU ENGAGE IN EXERCISE AT THIS LEVEL?: 0 MIN

## 2023-02-22 SDOH — ECONOMIC STABILITY: HOUSING INSECURITY
IN THE LAST 12 MONTHS, WAS THERE A TIME WHEN YOU DID NOT HAVE A STEADY PLACE TO SLEEP OR SLEPT IN A SHELTER (INCLUDING NOW)?: NO

## 2023-02-22 SDOH — ECONOMIC STABILITY: TRANSPORTATION INSECURITY
IN THE PAST 12 MONTHS, HAS LACK OF TRANSPORTATION KEPT YOU FROM MEETINGS, WORK, OR FROM GETTING THINGS NEEDED FOR DAILY LIVING?: NO

## 2023-02-22 SDOH — ECONOMIC STABILITY: FOOD INSECURITY: WITHIN THE PAST 12 MONTHS, YOU WORRIED THAT YOUR FOOD WOULD RUN OUT BEFORE YOU GOT MONEY TO BUY MORE.: NEVER TRUE

## 2023-02-22 SDOH — ECONOMIC STABILITY: TRANSPORTATION INSECURITY
IN THE PAST 12 MONTHS, HAS LACK OF RELIABLE TRANSPORTATION KEPT YOU FROM MEDICAL APPOINTMENTS, MEETINGS, WORK OR FROM GETTING THINGS NEEDED FOR DAILY LIVING?: NO

## 2023-02-22 SDOH — ECONOMIC STABILITY: FOOD INSECURITY: WITHIN THE PAST 12 MONTHS, THE FOOD YOU BOUGHT JUST DIDN'T LAST AND YOU DIDN'T HAVE MONEY TO GET MORE.: NEVER TRUE

## 2023-02-22 SDOH — HEALTH STABILITY: MENTAL HEALTH
STRESS IS WHEN SOMEONE FEELS TENSE, NERVOUS, ANXIOUS, OR CAN'T SLEEP AT NIGHT BECAUSE THEIR MIND IS TROUBLED. HOW STRESSED ARE YOU?: VERY MUCH

## 2023-02-22 SDOH — ECONOMIC STABILITY: INCOME INSECURITY: HOW HARD IS IT FOR YOU TO PAY FOR THE VERY BASICS LIKE FOOD, HOUSING, MEDICAL CARE, AND HEATING?: NOT HARD AT ALL

## 2023-02-22 ASSESSMENT — SOCIAL DETERMINANTS OF HEALTH (SDOH)
HOW OFTEN DO YOU HAVE SIX OR MORE DRINKS ON ONE OCCASION: MONTHLY
HOW OFTEN DO YOU GET TOGETHER WITH FRIENDS OR RELATIVES?: TWICE A WEEK
HOW OFTEN DO YOU GET TOGETHER WITH FRIENDS OR RELATIVES?: TWICE A WEEK
HOW HARD IS IT FOR YOU TO PAY FOR THE VERY BASICS LIKE FOOD, HOUSING, MEDICAL CARE, AND HEATING?: NOT HARD AT ALL
HOW OFTEN DO YOU HAVE A DRINK CONTAINING ALCOHOL: 2-3 TIMES A WEEK
HOW OFTEN DO YOU ATTEND CHURCH OR RELIGIOUS SERVICES?: NEVER
HOW MANY DRINKS CONTAINING ALCOHOL DO YOU HAVE ON A TYPICAL DAY WHEN YOU ARE DRINKING: 3 OR 4
DO YOU BELONG TO ANY CLUBS OR ORGANIZATIONS SUCH AS CHURCH GROUPS UNIONS, FRATERNAL OR ATHLETIC GROUPS, OR SCHOOL GROUPS?: NO
WITHIN THE PAST 12 MONTHS, YOU WORRIED THAT YOUR FOOD WOULD RUN OUT BEFORE YOU GOT THE MONEY TO BUY MORE: NEVER TRUE
HOW OFTEN DO YOU ATTEND CHURCH OR RELIGIOUS SERVICES?: NEVER
HOW OFTEN DO YOU ATTENT MEETINGS OF THE CLUB OR ORGANIZATION YOU BELONG TO?: NEVER
DO YOU BELONG TO ANY CLUBS OR ORGANIZATIONS SUCH AS CHURCH GROUPS UNIONS, FRATERNAL OR ATHLETIC GROUPS, OR SCHOOL GROUPS?: NO
HOW OFTEN DO YOU ATTENT MEETINGS OF THE CLUB OR ORGANIZATION YOU BELONG TO?: NEVER
IN A TYPICAL WEEK, HOW MANY TIMES DO YOU TALK ON THE PHONE WITH FAMILY, FRIENDS, OR NEIGHBORS?: MORE THAN THREE TIMES A WEEK
IN A TYPICAL WEEK, HOW MANY TIMES DO YOU TALK ON THE PHONE WITH FAMILY, FRIENDS, OR NEIGHBORS?: MORE THAN THREE TIMES A WEEK

## 2023-02-22 ASSESSMENT — ENCOUNTER SYMPTOMS
MYALGIAS: 0
WEIGHT LOSS: 0
SORE THROAT: 0
NAUSEA: 0
ORTHOPNEA: 0
BLURRED VISION: 0
DIARRHEA: 0
VOMITING: 0
BRUISES/BLEEDS EASILY: 0
NERVOUS/ANXIOUS: 0
COUGH: 0
DIZZINESS: 0
PALPITATIONS: 0
SHORTNESS OF BREATH: 0
CHILLS: 0
DEPRESSION: 0
WEAKNESS: 0
HEADACHES: 0
FEVER: 0
ABDOMINAL PAIN: 0
FALLS: 0
DIAPHORESIS: 0

## 2023-02-22 ASSESSMENT — LIFESTYLE VARIABLES
HOW MANY STANDARD DRINKS CONTAINING ALCOHOL DO YOU HAVE ON A TYPICAL DAY: 3 OR 4
HOW OFTEN DO YOU HAVE SIX OR MORE DRINKS ON ONE OCCASION: MONTHLY
HOW OFTEN DO YOU HAVE A DRINK CONTAINING ALCOHOL: 2-3 TIMES A WEEK
SKIP TO QUESTIONS 9-10: 0
AUDIT-C TOTAL SCORE: 6

## 2023-02-22 ASSESSMENT — PATIENT HEALTH QUESTIONNAIRE - PHQ9: CLINICAL INTERPRETATION OF PHQ2 SCORE: 0

## 2023-02-22 NOTE — PROGRESS NOTES
Subjective:     CC: Establish as a new patient    HPI:   Shelby presents today with    Problem   Dysuria    Acute, uncontrolled.  Started a month ago.  Better with cranberry pills.  Comes and goes.  Occasional frequency (comes/goes).  Denies fever, N/V, flank pain.     Perimenopause    Chronic, controlled.  Started vaginal hormones for vaginal dryness.  Just started it around January 2023.  GYN following.     Dermatofibroma of Deltoid Region, Left    Chronic, controlled.  Left arm, since 2018.  Small, dry, scaling lesion; scabs at times.  Saw derm; no concerns.  Whole body check.     Class 1 Obesity Due to Excess Calories Without Serious Comorbidity With Body Mass Index (Bmi) of 31.0 to 31.9 in Adult    Chronic, uncontrolled.  Discussed increasing plant-based foods, decreasing animal-based foods.  Increase daily activity, aiming for 30-45 minutes brisk exercise daily.       Hiatal Hernia    Nissen fundoplication 2017.     Gerd Without Esophagitis    Chronic, uncontrolled.  Resolved after Nissen fundoplication 2017.  Started back again around 6 months ago.  Daily for the last 1-2 months.  Taking a lot of Tums.  No other medications.  No specific triggers.  Constant, daily.  Worse with supine.     Primary Insomnia    Chronic, uncontrolled.  Due to hot flashes.  GYN gave her something to take.  Can't remember the name.     Hyperlipidemia, Unspecified    Chronic, uncontrolled.   Latest Labs:   Lab Results   Component Value Date/Time    CHOLSTRLTOT 242 (H) 11/23/2016 10:04 AM     (H) 11/23/2016 10:04 AM    HDL 46 11/23/2016 10:04 AM    TRIGLYCERIDE 141 11/23/2016 10:04 AM      Medications: none  Medication side effects:   Diet/Exercise:   Family History of high cholesterol or heart disease?   Risk calculator: The ASCVD Risk score (Maywood DK, et al., 2019) failed to calculate.        Vitamin D Deficiency    Chronic, uncontrolled.  Does not take supplementation.     Urge Incontinence    Chronic, controlled.  Only  "has urgency.  No stress incontinence.     Hypothyroidism (Acquired)    Chronic, controlled.  Tolerating levothyroxine 150mcg daily.           Health Maintenance: Completed    ROS:  Review of Systems   Constitutional:  Positive for malaise/fatigue (Occasional). Negative for chills, diaphoresis, fever and weight loss.   HENT:  Negative for hearing loss and sore throat.    Eyes:  Negative for blurred vision.   Respiratory:  Negative for cough and shortness of breath.    Cardiovascular:  Negative for chest pain, palpitations, orthopnea and leg swelling.   Gastrointestinal:  Negative for abdominal pain, diarrhea, nausea and vomiting.   Genitourinary:  Positive for dysuria. Negative for frequency, hematuria and urgency.   Musculoskeletal:  Negative for falls, joint pain and myalgias.   Skin:  Negative for rash.   Neurological:  Negative for dizziness, weakness and headaches.   Endo/Heme/Allergies:  Does not bruise/bleed easily.   Psychiatric/Behavioral:  Negative for depression. The patient is not nervous/anxious.      Objective:     Exam:  /66 (BP Location: Left arm, Patient Position: Sitting, BP Cuff Size: Adult)   Pulse 74   Temp 36.6 °C (97.8 °F) (Temporal)   Resp 16   Ht 1.575 m (5' 2\")   Wt 78.5 kg (173 lb)   LMP 12/01/2022 (Approximate)   SpO2 96%   Breastfeeding No   BMI 31.64 kg/m²  Body mass index is 31.64 kg/m².    Physical Exam  Constitutional:       Appearance: Normal appearance.   HENT:      Head: Normocephalic and atraumatic.      Right Ear: Tympanic membrane, ear canal and external ear normal.      Left Ear: Tympanic membrane, ear canal and external ear normal.      Mouth/Throat:      Mouth: Mucous membranes are moist.      Pharynx: Oropharynx is clear. No oropharyngeal exudate or posterior oropharyngeal erythema.   Eyes:      Extraocular Movements: Extraocular movements intact.      Conjunctiva/sclera: Conjunctivae normal.      Pupils: Pupils are equal, round, and reactive to light. "   Cardiovascular:      Rate and Rhythm: Normal rate and regular rhythm.      Heart sounds: Normal heart sounds.   Pulmonary:      Effort: Pulmonary effort is normal.      Breath sounds: Normal breath sounds.   Abdominal:      General: Abdomen is flat. Bowel sounds are normal. There is no distension.      Palpations: Abdomen is soft. There is no mass.      Tenderness: There is no abdominal tenderness. There is no guarding.   Musculoskeletal:      Cervical back: Normal range of motion and neck supple.   Skin:     General: Skin is warm and dry.   Neurological:      General: No focal deficit present.      Mental Status: She is alert and oriented to person, place, and time.   Psychiatric:         Mood and Affect: Mood normal.           Assessment & Plan:     52 y.o. female with the following -     1. Hypothyroidism (acquired)  Chronic, controlled.  Continue levothyroxine 150 mcg daily.  Due to repeat labs.    - TSH; Future  - THYROID PEROXIDASE  (TPO) AB; Future  - ANTITHYROGLOBULIN AB; Future  - levothyroxine (SYNTHROID) 150 MCG Tab; Take 1 Tablet by mouth every morning on an empty stomach.  Dispense: 90 Tablet; Refill: 1    2. Hyperlipidemia, unspecified hyperlipidemia type  Chronic, uncontrolled.  Due to repeat labs.  Discussed increasing plant-based foods, decreasing animal-based foods.  Increase daily activity, aiming for 30-45 minutes brisk exercise daily.    - Lipid Profile; Future    3. GERD without esophagitis  Chronic, uncontrolled.  H. pylori breath testing.  May need to consider GI referral.    - H. PYLORI, UREA BREATH TEST, ADULT; Future    4. Perimenopause  Chronic, stable.  Managed by GYN.    5. Urge incontinence  Chronic, stable.  History of bladder sling.  Has not seen urology in years.    6. Vitamin D deficiency  Chronic, control unknown.  Does not take supplementation.  Due to repeat labs.    - VITAMIN D,25 HYDROXY (DEFICIENCY); Future    7. Class 1 obesity due to excess calories without serious  comorbidity with body mass index (BMI) of 31.0 to 31.9 in adult  Chronic, uncontrolled.  Discussed increasing plant-based foods, decreasing animal-based foods.  Increase daily activity, aiming for 30-45 minutes brisk exercise daily.    8. Dysuria  Chronic, uncontrolled.  Negative UA.  Sending for culture.  UA in November 2022 showed only trace leukocyte esterase but chemic positive blood culture.  Referring to urology.    - POCT Urinalysis  - URINE CULTURE(NEW); Future    9. Hiatal hernia  Chronic, controlled.  Status post Nissen fundoplication 2017.    10. Primary insomnia  Chronic, uncontrolled.  Managed by GYN.  Trial paroxetine low-dose for hot flashes.    11. Other fatigue  Chronic, stable.  Checking labs.    - CBC WITH DIFFERENTIAL; Future  - Comp Metabolic Panel; Future    12. Encounter for screening mammogram for malignant neoplasm of breast    - MA-SCREENING MAMMO BILAT W/TOMOSYNTHESIS W/CAD; Future    13. Colon cancer screening    - Referral to Gastroenterology    Patient is due for Tdap but is unavailable at this time.  Patient would also like to start the hep B series but would like to wait until follow-up.      Return if symptoms worsen or fail to improve.    Please note that this dictation was created using voice recognition software. I have made every reasonable attempt to correct obvious errors, but I expect that there are errors of grammar and possibly content that I did not discover before finalizing the note.

## 2023-02-24 RX ORDER — NITROFURANTOIN 25; 75 MG/1; MG/1
100 CAPSULE ORAL 2 TIMES DAILY
Qty: 10 CAPSULE | Refills: 0 | Status: SHIPPED | OUTPATIENT
Start: 2023-02-24 | End: 2023-03-01

## 2023-03-09 ENCOUNTER — HOSPITAL ENCOUNTER (OUTPATIENT)
Dept: RADIOLOGY | Facility: MEDICAL CENTER | Age: 53
End: 2023-03-09
Attending: PHYSICIAN ASSISTANT
Payer: COMMERCIAL

## 2023-03-09 DIAGNOSIS — Z12.31 ENCOUNTER FOR SCREENING MAMMOGRAM FOR MALIGNANT NEOPLASM OF BREAST: ICD-10-CM

## 2023-03-09 PROCEDURE — 77063 BREAST TOMOSYNTHESIS BI: CPT

## 2023-03-10 NOTE — PROGRESS NOTES
Assessment & Plan     Moderate episode of recurrent major depressive disorder (H)  Will consult with nephrology for recommendations of SNRI.  Consider wellbutrin vs. Cymbalta. Will call pt.  Verbally contracts for safety.     Skin ulcer of hand, limited to breakdown of skin (H)  Start using your cream on your hands.  We will call you next week to check in. If no improvement will refer back to wound clinic for assessment.     Deficit in activities of daily living (ADL)  Recommend therapies  - Physical Therapy Referral; Future  - Occupational Therapy Referral; Future    Self-care deficit for toileting  Recommend therapies  - Physical Therapy Referral; Future  - Occupational Therapy Referral; Future    Contracture of hand  Therapies   - Physical Therapy Referral; Future  - Occupational Therapy Referral; Future    Idiopathic peripheral neuropathy  Referral for pt and ot  Patient also to call [facility] (referral placed from Endocrinology) for consult.              Blood sugar testing frequency justification:  Uncontrolled diabetes  Depression Screening Follow Up    PHQ 3/10/2023   PHQ-9 Total Score 10   Q9: Thoughts of better off dead/self-harm past 2 weeks Several days   F/U: Thoughts of suicide or self-harm No   F/U: Safety concerns No     Last PHQ-9 3/10/2023   1.  Little interest or pleasure in doing things 1   2.  Feeling down, depressed, or hopeless 1   3.  Trouble falling or staying asleep, or sleeping too much 1   4.  Feeling tired or having little energy 1   5.  Poor appetite or overeating 1   6.  Feeling bad about yourself 1   7.  Trouble concentrating 2   8.  Moving slowly or restless 1   Q9: Thoughts of better off dead/self-harm past 2 weeks 1   PHQ-9 Total Score 10   Difficulty at work, home, or with people -   In the past two weeks have you had thoughts of suicide or self harm? No   Do you have concerns about your personal safety or the safety of others? No               Follow Up      Follow Up  Spouse Blayne at bedside, going home now. Phone number [phone], Will be back in am. Call if any changes in status.    Actions Taken  Patient declined referral.    Discussed the following ways the patient can remain in a safe environment:  be around others  See Patient Instructions    No follow-ups on file.    Lisa Pierre PA-C  [facility]    Subjective   Caio is a 67 year old, presenting for the following health issues:  RECHECK (2m f/unit(s) from last visit)      History of Present Illness       Mental Health Follow-up:  Patient presents to follow-up on Depression & Anxiety.Patient's depression since last visit has been:  Worse  The patient is having other symptoms associated with depression.  Patient's anxiety since last visit has been:  Better  The patient is having other symptoms associated with anxiety.  Any significant life events: No  Patient is not feeling anxious or having panic attacks.  Patient has no concerns about alcohol or drug use.    Diabetes:   He presents for follow up of diabetes.  He is checking home blood glucose four or more times daily. He checks blood glucose before meals and before and after meals.  Blood glucose is sometimes over 200 and never under 70. He is aware of hypoglycemia symptoms including shakiness, dizziness, weakness, lethargy and confusion. He is concerned about other.  He is having numbness in feet and weight gain.         He eats 0-1 servings of fruits and vegetables daily.He consumes 0 sweetened beverage(s) daily.He exercises with enough effort to increase his heart rate 10 to 19 minutes per day.  He exercises with enough effort to increase his heart rate 3 or less days per week. He is missing 3 dose(s) of medications per week.    Today's PHQ-9         PHQ-9 Total Score: 10    PHQ-9 Q9 Thoughts of better off dead/self-harm past 2 weeks :   Several days  Thoughts of suicide or self harm: (P) No  Self-harm Plan:     Self-harm Action:       Safety concerns for self or others: (P) No    How difficult have these problems made it for you to do your work, take  "care of things at home, or get along with other people: Somewhat difficult  Today's JOSE MANUEL-7 Score: 5   Patient reports passive SI today, no HI.  No plans. Symptoms worsening and increased irritability being w/o medications for mood.  These were stopped due to SIADH.     Struggling with dressing, caring for self and toileting due to neuropathy. Patient feeling increased frustration with wiping after BM has he really can't do this anymore. He's wondering if therapies would help.      Review of Systems   Constitutional, HEENT, cardiovascular, pulmonary, gi and gu systems are negative, except as otherwise noted.      Objective    /65 (BP Location: Right arm, Patient Position: Sitting, Cuff Size: Adult Regular)   Pulse 84   Temp 97.6  F (36.4  C) (Oral)   Ht 1.651 m (5' 5\")   Wt 105.7 kg (233 lb 2 oz)   SpO2 97%   BMI 38.79 kg/m    Body mass index is 38.79 kg/m .  Physical Exam   GENERAL APPEARANCE: healthy, alert and no distress  MSK: bilateral contractures of hands  SKIN: 2-3 superficial open 4-5 mm ulcerations on hands, limited to skin  PSYCH: mentation appears normal and affect flat                    "

## 2023-03-16 ENCOUNTER — HOSPITAL ENCOUNTER (OUTPATIENT)
Dept: RADIOLOGY | Facility: MEDICAL CENTER | Age: 53
End: 2023-03-16
Payer: COMMERCIAL

## 2023-03-22 ENCOUNTER — HOSPITAL ENCOUNTER (OUTPATIENT)
Dept: RADIOLOGY | Facility: MEDICAL CENTER | Age: 53
End: 2023-03-22
Attending: PHYSICIAN ASSISTANT
Payer: COMMERCIAL

## 2023-03-22 DIAGNOSIS — R92.8 ABNORMAL MAMMOGRAM: ICD-10-CM

## 2023-03-22 PROCEDURE — 76642 ULTRASOUND BREAST LIMITED: CPT | Mod: LT

## 2023-05-09 ENCOUNTER — HOSPITAL ENCOUNTER (EMERGENCY)
Facility: MEDICAL CENTER | Age: 53
End: 2023-05-09
Attending: EMERGENCY MEDICINE
Payer: COMMERCIAL

## 2023-05-09 ENCOUNTER — APPOINTMENT (OUTPATIENT)
Dept: RADIOLOGY | Facility: MEDICAL CENTER | Age: 53
End: 2023-05-09
Attending: EMERGENCY MEDICINE
Payer: COMMERCIAL

## 2023-05-09 VITALS
TEMPERATURE: 97.6 F | SYSTOLIC BLOOD PRESSURE: 117 MMHG | HEART RATE: 77 BPM | RESPIRATION RATE: 16 BRPM | HEIGHT: 62 IN | DIASTOLIC BLOOD PRESSURE: 80 MMHG | OXYGEN SATURATION: 99 % | WEIGHT: 170.42 LBS | BODY MASS INDEX: 31.36 KG/M2
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
## 2023-05-09 PROCEDURE — 73130 X-RAY EXAM OF HAND: CPT | Mod: RT

## 2023-05-09 PROCEDURE — 700102 HCHG RX REV CODE 250 W/ 637 OVERRIDE(OP): Performed by: EMERGENCY MEDICINE

## 2023-05-09 PROCEDURE — 90471 IMMUNIZATION ADMIN: CPT

## 2023-05-09 PROCEDURE — 303747 HCHG EXTRA SUTURE

## 2023-05-09 PROCEDURE — A9270 NON-COVERED ITEM OR SERVICE: HCPCS | Performed by: EMERGENCY MEDICINE

## 2023-05-09 PROCEDURE — 90715 TDAP VACCINE 7 YRS/> IM: CPT | Performed by: EMERGENCY MEDICINE

## 2023-05-09 PROCEDURE — 700111 HCHG RX REV CODE 636 W/ 250 OVERRIDE (IP): Performed by: EMERGENCY MEDICINE

## 2023-05-09 PROCEDURE — 700101 HCHG RX REV CODE 250: Performed by: EMERGENCY MEDICINE

## 2023-05-09 PROCEDURE — 304999 HCHG REPAIR-SIMPLE/INTERMED LEVEL 1

## 2023-05-09 PROCEDURE — 99283 EMERGENCY DEPT VISIT LOW MDM: CPT

## 2023-05-09 PROCEDURE — 304217 HCHG IRRIGATION SYSTEM

## 2023-05-09 RX ORDER — AMOXICILLIN AND CLAVULANATE POTASSIUM 875; 125 MG/1; MG/1
1 TABLET, FILM COATED ORAL 2 TIMES DAILY
Qty: 14 TABLET | Refills: 0 | Status: ACTIVE | OUTPATIENT
Start: 2023-05-09 | End: 2023-05-16

## 2023-05-09 RX ORDER — LIDOCAINE HYDROCHLORIDE 20 MG/ML
20 INJECTION, SOLUTION INFILTRATION; PERINEURAL ONCE
Status: COMPLETED | OUTPATIENT
Start: 2023-05-09 | End: 2023-05-09

## 2023-05-09 RX ORDER — AMOXICILLIN AND CLAVULANATE POTASSIUM 875; 125 MG/1; MG/1
1 TABLET, FILM COATED ORAL ONCE
Status: COMPLETED | OUTPATIENT
Start: 2023-05-09 | End: 2023-05-09

## 2023-05-09 RX ORDER — IBUPROFEN 600 MG/1
600 TABLET ORAL ONCE
Status: COMPLETED | OUTPATIENT
Start: 2023-05-09 | End: 2023-05-09

## 2023-05-09 RX ADMIN — IBUPROFEN 600 MG: 600 TABLET, FILM COATED ORAL at 11:15

## 2023-05-09 RX ADMIN — CLOSTRIDIUM TETANI TOXOID ANTIGEN (FORMALDEHYDE INACTIVATED), CORYNEBACTERIUM DIPHTHERIAE TOXOID ANTIGEN (FORMALDEHYDE INACTIVATED), BORDETELLA PERTUSSIS TOXOID ANTIGEN (GLUTARALDEHYDE INACTIVATED), BORDETELLA PERTUSSIS FILAMENTOUS HEMAGGLUTININ ANTIGEN (FORMALDEHYDE INACTIVATED), BORDETELLA PERTUSSIS PERTACTIN ANTIGEN, AND BORDETELLA PERTUSSIS FIMBRIAE 2/3 ANTIGEN 0.5 ML: 5; 2; 2.5; 5; 3; 5 INJECTION, SUSPENSION INTRAMUSCULAR at 11:08

## 2023-05-09 RX ADMIN — LIDOCAINE HYDROCHLORIDE 20 ML: 20 INJECTION, SOLUTION INFILTRATION; PERINEURAL at 11:00

## 2023-05-09 RX ADMIN — AMOXICILLIN AND CLAVULANATE POTASSIUM 1 TABLET: 875; 125 TABLET, FILM COATED ORAL at 12:27

## 2023-05-09 ASSESSMENT — PAIN DESCRIPTION - PAIN TYPE: TYPE: ACUTE PAIN

## 2023-05-09 NOTE — ED NOTES
[facility]  Operative Report  2018     Nathaly Latif Patient Status:  Hospital Outpatient Surgery    1952 MRN [id number]   [facility] SURGERY Attending Capo Maciel MD   Hosp Day # 0 PCP Brigid Vyas MD     Indicat ERP at bedside.   is repeated at the T7, T8, T9 levels. The patient tolerated procedure very well. The patient was observed until discharge criteria met. Discharge instructions were given and patient was released to a responsible adult. Complications: None.     Fo

## 2023-05-09 NOTE — ED TRIAGE NOTES
Shelby De Paz  52 y.o. female  Chief Complaint   Patient presents with    Dog Bite     Pt was bit on right hand by her daughters dog. Pt is up to date on all vaccinations. Pt states she is unsure when her last tetanus vaccine was.        Vitals:    05/09/23 0954   BP: 119/65   Pulse: 75   Resp: 16   Temp: 36.3 °C (97.4 °F)   SpO2: 99%       Patient educated on triage process and encouraged to alert staff of any changes in condition    Pt ambulatory to triage with the above complaint.

## 2023-05-09 NOTE — ED PROVIDER NOTES
ED Provider Note    CHIEF COMPLAINT  Chief Complaint   Patient presents with    Dog Bite     Pt was bit on right hand by her daughters dog. Pt is up to date on all vaccinations. Pt states she is unsure when her last tetanus vaccine was.        EXTERNAL RECORDS REVIEWED      HPI/JACKIE      Shelby De Paz is a 52 y.o. female who presents to the emergency department complaining of right hand pain after dog bite.  The patient's been right-handed ulnar aspect by family dog.  This is a lab mix.  She complains of numbness in the fifth finger and slightly in the fourth finger.  She complains of pain in this area as well that radiates up her arm.  No other injuries or complaints.  Tetanus is not up-to-date.    PAST MEDICAL HISTORY   has a past medical history of Asthma, Breath shortness, Bronchitis (2017), Cold (2017), Dental disorder, Disorder of thyroid, Finger fracture, GERD (gastroesophageal reflux disease), Heart burn, Hiatus hernia syndrome, Hyperlipidemia, Indigestion, Insomnia, Polycythemia, Right hand pain, Snoring, Stress incontinence, Tobacco abuse, Urinary incontinence, and Vitamin D deficiency.    SURGICAL HISTORY   has a past surgical history that includes dental extraction(s); bladder sling female (2016); other (); other; nissen fundoplication laparoscopic (10/23/2017); and hernia repair.    FAMILY HISTORY  Family History   Problem Relation Age of Onset    Heart Attack Mother 57    Stroke Mother     Glaucoma Mother     Heart Attack Other 52        GF    Lung Disease Father     Heart Disease Maternal Grandfather     Drug abuse Sister     Alcohol abuse Sister        SOCIAL HISTORY  Social History     Tobacco Use    Smoking status: Former     Packs/day: 0.50     Years: 20.00     Pack years: 10.00     Types: Cigarettes     Quit date: 2015     Years since quittin.3    Smokeless tobacco: Never   Vaping Use    Vaping Use: Never used   Substance and Sexual Activity    Alcohol use:  "Yes     Alcohol/week: 3.6 oz     Types: 2 Glasses of wine, 4 Cans of beer per week     Comment: 5-10 per month    Drug use: No    Sexual activity: Yes     Partners: Male     Birth control/protection: None     Comment: 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        CURRENT MEDICATIONS  Home Medications    **Home medications have not yet been reviewed for this encounter**         ALLERGIES  Allergies   Allergen Reactions    Contrast Media With Iodine [Iodine] Itching and Swelling     Throat and mouth  [id number]    Other Misc Unspecified     cats       PHYSICAL EXAM  VITAL SIGNS: /65   Pulse 75   Temp 36.3 °C (97.4 °F) (Temporal)   Resp 16   Ht 1.575 m (5' 2\")   Wt 77.3 kg (170 lb 6.7 oz)   SpO2 99%   BMI 31.17 kg/m²    Right hand has tenderness over the fourth and fifth metacarpal.  There is a fairly large early 2 cm laceration over the palmar aspect overlying the fifth metacarpal.  There is adipose tissue fungating from this gaping wound.  There is a smaller laceration more laterally.  Decreased sensation over the fifth finger and part of the fourth finger is noted.  Normal tendon function.    DIAGNOSTIC STUDIES / PROCEDURES    Laceration Repair Procedure Note    Indication: Laceration    Procedure: The patient was placed in the appropriate position and anesthesia around the laceration was obtained by infiltration using 2% Lidocaine without epinephrine. The area was then cleansed with betadine and draped in a sterile fashion, irrigated with normal saline, explored with no foreign bodies discovered, and draped in a sterile fashion. The laceration was closed with 4-0 Ethilon using interrupted sutures. There were no additional lacerations requiring repair. The wound area was then dressed with bacitracin.      Total repaired wound length: 1.5 cm.     Other Items: Suture count: 1    The patient tolerated the procedure well.    Complications: None      RADIOLOGY  I have independently interpreted " the diagnostic imaging associated with this visit and am waiting the final reading from the radiologist.   My preliminary interpretation is as follows: Do not see a fracture on the x-ray  Radiologist interpretation:   DX-HAND 3+ RIGHT   Final Result      Soft tissue swelling. No fracture or dislocation. No radiopaque foreign body.            COURSE & MEDICAL DECISION MAKING    ED Observation Status? No; Patient does not meet criteria for ED Observation.     INITIAL ASSESSMENT, COURSE AND PLAN  Care Narrative:     Patient presents to the emergency department for evaluation of a dog bite to the right hand.  She has a small laceration on the lateral aspect over the hypothenar eminence which is about a centimeter in length does not require closure.  Hand is diffusely tender and swollen.    X-rays obtained is no fracture.  She has some numbness to her fifth finger which may be just a nerve injury like contusion or may be severed nerve.  She will be referred to the hand surgeon.    Her tendon seem intact.  The laceration on the palmar aspect is about 1 and half to 2 cm in length and she is gapes open with subcutaneous adipose tissue fungating out of it.    After this was cleaned I tried to just reapproximate the wound and have the adipose tissue sit back in place but it still continues to fungate out.  This will not heal well if we do not put a suture in it because the skin edges will not be in contact through such a large amount of adipose tissue in between.    I had a shared decision-making conversation with the patient regarding closing this.  The risk of infection.  Ultimately she is agreeable with this plan.    Tetanus is updated she is given oral Augmentin the wound is cleaned and closed above with 1 suture.  She understands return precautions risk of infection.  She will return for pain swelling redness or other concerns.  Sutures out in 10 days.  She is referred to the hand surgeon for the numbness in her hand.   Questions answered, she is agreeable to plan she is discharged in good condition.          ADDITIONAL PROBLEM LIST    She is prescribed Augmentin for a week.    Escalation of care considered, and ultimately not performed:blood analysis    Michelle Diaz P.A.-C.  [address]  [facility] [address]  [phone]          Stiven Bentley M.D.  [address] [facility] [id number] [phone]    Schedule an appointment as soon as possible for a visit in 2 days          FINAL DIAGNOSIS  1. Dog bite, initial encounter           Electronically signed by: Javier Kumari M.D., 5/9/2023 10:31 AM

## 2023-05-09 NOTE — DISCHARGE INSTRUCTIONS
Keep wound clean and dry, watch for signs of infection.  Sutures out in about 10 days here or your doctor.  Return for more pain, swelling, redness or other concerns.  Follow-up with a hand surgeon, take antibiotics as prescribed

## 2023-05-15 ENCOUNTER — HOSPITAL ENCOUNTER (OUTPATIENT)
Dept: LAB | Facility: MEDICAL CENTER | Age: 53
End: 2023-05-15
Attending: PHYSICIAN ASSISTANT
Payer: COMMERCIAL

## 2023-05-15 DIAGNOSIS — K21.9 GERD WITHOUT ESOPHAGITIS: ICD-10-CM

## 2023-05-15 DIAGNOSIS — R53.83 OTHER FATIGUE: ICD-10-CM

## 2023-05-15 DIAGNOSIS — E03.9 HYPOTHYROIDISM (ACQUIRED): ICD-10-CM

## 2023-05-15 DIAGNOSIS — E55.9 VITAMIN D DEFICIENCY: ICD-10-CM

## 2023-05-15 DIAGNOSIS — E78.5 HYPERLIPIDEMIA, UNSPECIFIED HYPERLIPIDEMIA TYPE: ICD-10-CM

## 2023-05-15 LAB
25(OH)D3 SERPL-MCNC: 18 NG/ML (ref 30–100)
ALBUMIN SERPL BCP-MCNC: 4.4 G/DL (ref 3.2–4.9)
ALBUMIN/GLOB SERPL: 1.4 G/DL
ALP SERPL-CCNC: 108 U/L (ref 30–99)
ALT SERPL-CCNC: 42 U/L (ref 2–50)
ANION GAP SERPL CALC-SCNC: 16 MMOL/L (ref 7–16)
AST SERPL-CCNC: 28 U/L (ref 12–45)
BASOPHILS # BLD AUTO: 0.8 % (ref 0–1.8)
BASOPHILS # BLD: 0.06 K/UL (ref 0–0.12)
BILIRUB SERPL-MCNC: 0.3 MG/DL (ref 0.1–1.5)
BUN SERPL-MCNC: 10 MG/DL (ref 8–22)
CALCIUM ALBUM COR SERPL-MCNC: 9.2 MG/DL (ref 8.5–10.5)
CALCIUM SERPL-MCNC: 9.5 MG/DL (ref 8.5–10.5)
CHLORIDE SERPL-SCNC: 101 MMOL/L (ref 96–112)
CHOLEST SERPL-MCNC: 261 MG/DL (ref 100–199)
CO2 SERPL-SCNC: 23 MMOL/L (ref 20–33)
CREAT SERPL-MCNC: 0.81 MG/DL (ref 0.5–1.4)
EOSINOPHIL # BLD AUTO: 0.3 K/UL (ref 0–0.51)
EOSINOPHIL NFR BLD: 4.1 % (ref 0–6.9)
ERYTHROCYTE [DISTWIDTH] IN BLOOD BY AUTOMATED COUNT: 41.7 FL (ref 35.9–50)
FASTING STATUS PATIENT QL REPORTED: NORMAL
GFR SERPLBLD CREATININE-BSD FMLA CKD-EPI: 88 ML/MIN/1.73 M 2
GLOBULIN SER CALC-MCNC: 3.2 G/DL (ref 1.9–3.5)
GLUCOSE SERPL-MCNC: 87 MG/DL (ref 65–99)
HCT VFR BLD AUTO: 44.5 % (ref 37–47)
HDLC SERPL-MCNC: 58 MG/DL
HGB BLD-MCNC: 14.4 G/DL (ref 12–16)
IMM GRANULOCYTES # BLD AUTO: 0.02 K/UL (ref 0–0.11)
IMM GRANULOCYTES NFR BLD AUTO: 0.3 % (ref 0–0.9)
LDLC SERPL CALC-MCNC: 181 MG/DL
LYMPHOCYTES # BLD AUTO: 1.94 K/UL (ref 1–4.8)
LYMPHOCYTES NFR BLD: 26.5 % (ref 22–41)
MCH RBC QN AUTO: 30.5 PG (ref 27–33)
MCHC RBC AUTO-ENTMCNC: 32.4 G/DL (ref 33.6–35)
MCV RBC AUTO: 94.3 FL (ref 81.4–97.8)
MONOCYTES # BLD AUTO: 0.49 K/UL (ref 0–0.85)
MONOCYTES NFR BLD AUTO: 6.7 % (ref 0–13.4)
NEUTROPHILS # BLD AUTO: 4.5 K/UL (ref 2–7.15)
NEUTROPHILS NFR BLD: 61.6 % (ref 44–72)
NRBC # BLD AUTO: 0 K/UL
NRBC BLD-RTO: 0 /100 WBC
PLATELET # BLD AUTO: 342 K/UL (ref 164–446)
PMV BLD AUTO: 10.5 FL (ref 9–12.9)
POTASSIUM SERPL-SCNC: 4 MMOL/L (ref 3.6–5.5)
PROT SERPL-MCNC: 7.6 G/DL (ref 6–8.2)
RBC # BLD AUTO: 4.72 M/UL (ref 4.2–5.4)
SODIUM SERPL-SCNC: 140 MMOL/L (ref 135–145)
THYROPEROXIDASE AB SERPL-ACNC: 16 IU/ML (ref 0–9)
TRIGL SERPL-MCNC: 108 MG/DL (ref 0–149)
TSH SERPL DL<=0.005 MIU/L-ACNC: 0.02 UIU/ML (ref 0.38–5.33)
WBC # BLD AUTO: 7.3 K/UL (ref 4.8–10.8)

## 2023-05-15 PROCEDURE — 83013 H PYLORI (C-13) BREATH: CPT

## 2023-05-15 PROCEDURE — 36415 COLL VENOUS BLD VENIPUNCTURE: CPT

## 2023-05-15 PROCEDURE — 84443 ASSAY THYROID STIM HORMONE: CPT

## 2023-05-15 PROCEDURE — 86376 MICROSOMAL ANTIBODY EACH: CPT

## 2023-05-15 PROCEDURE — 86800 THYROGLOBULIN ANTIBODY: CPT

## 2023-05-15 PROCEDURE — 80053 COMPREHEN METABOLIC PANEL: CPT

## 2023-05-15 PROCEDURE — 82306 VITAMIN D 25 HYDROXY: CPT

## 2023-05-15 PROCEDURE — 80061 LIPID PANEL: CPT

## 2023-05-15 PROCEDURE — 85025 COMPLETE CBC W/AUTO DIFF WBC: CPT

## 2023-05-17 LAB
THYROGLOB AB SERPL-ACNC: 7.5 IU/ML (ref 0–4)
UREA BREATH TEST QL: NEGATIVE

## 2023-05-24 ENCOUNTER — OFFICE VISIT (OUTPATIENT)
Dept: MEDICAL GROUP | Facility: PHYSICIAN GROUP | Age: 53
End: 2023-05-24
Payer: COMMERCIAL

## 2023-05-24 VITALS
TEMPERATURE: 97.7 F | HEIGHT: 62 IN | WEIGHT: 160 LBS | SYSTOLIC BLOOD PRESSURE: 118 MMHG | OXYGEN SATURATION: 97 % | HEART RATE: 78 BPM | DIASTOLIC BLOOD PRESSURE: 64 MMHG | RESPIRATION RATE: 16 BRPM | BODY MASS INDEX: 29.44 KG/M2

## 2023-05-24 DIAGNOSIS — E55.9 VITAMIN D DEFICIENCY: ICD-10-CM

## 2023-05-24 DIAGNOSIS — K21.9 GERD WITHOUT ESOPHAGITIS: ICD-10-CM

## 2023-05-24 DIAGNOSIS — Z12.11 COLON CANCER SCREENING: ICD-10-CM

## 2023-05-24 DIAGNOSIS — E78.5 DYSLIPIDEMIA: ICD-10-CM

## 2023-05-24 DIAGNOSIS — E03.9 HYPOTHYROIDISM (ACQUIRED): ICD-10-CM

## 2023-05-24 PROCEDURE — 3078F DIAST BP <80 MM HG: CPT | Performed by: PHYSICIAN ASSISTANT

## 2023-05-24 PROCEDURE — 99214 OFFICE O/P EST MOD 30 MIN: CPT | Performed by: PHYSICIAN ASSISTANT

## 2023-05-24 PROCEDURE — 3074F SYST BP LT 130 MM HG: CPT | Performed by: PHYSICIAN ASSISTANT

## 2023-05-24 RX ORDER — LEVOTHYROXINE SODIUM 137 UG/1
137 TABLET ORAL
Qty: 90 TABLET | Refills: 1 | Status: SHIPPED | OUTPATIENT
Start: 2023-05-24 | End: 2023-12-18

## 2023-05-24 ASSESSMENT — ENCOUNTER SYMPTOMS
CHILLS: 0
FEVER: 0
SHORTNESS OF BREATH: 0

## 2023-05-24 ASSESSMENT — FIBROSIS 4 INDEX: FIB4 SCORE: 0.63

## 2023-05-24 NOTE — PROGRESS NOTES
"Subjective:     CC: Chronic issues    HPI:   Shelby presents today with    Problem   Gerd Without Esophagitis    Chronic, uncontrolled.  Resolved after Nissen fundoplication 2017.  Started back again around 6 months ago.  Daily for the last 1-2 months.  Taking a lot of Tums.  No other medications.  No specific triggers.  Constant, daily.  Worse with supine.  Will send back to GI.       Dyslipidemia    Chronic, uncontrolled.   Latest Labs:   Lab Results   Component Value Date/Time    CHOLSTRLTOT 261 (H) 05/15/2023 03:13 PM     (H) 05/15/2023 03:13 PM    HDL 58 05/15/2023 03:13 PM    TRIGLYCERIDE 108 05/15/2023 03:13 PM      Medications: none  Medication side effects:   Diet/Exercise:   Family History of high cholesterol or heart disease?   Risk calculator: The 10-year ASCVD risk score (Ashly KEY, et al., 2019) is: 1.4%        Vitamin D Deficiency    Chronic, uncontrolled.  Does not take supplementation.  Recommend starting 3595-5997 IU daily.       Hypothyroidism (Acquired)    Chronic, controlled.  Tolerating levothyroxine 150mcg daily.  TSH 0.020.  Decreasing levothyroxine to 137 mcg daily.  Repeat labs in 2 months.         Healthcare Maintenance (Resolved)       Health Maintenance: Completed    ROS:  Review of Systems   Constitutional:  Negative for chills and fever.   Respiratory:  Negative for shortness of breath.    Cardiovascular:  Negative for chest pain.       Objective:     Exam:  /64 (BP Location: Right arm, Patient Position: Sitting, BP Cuff Size: Adult)   Pulse 78   Temp 36.5 °C (97.7 °F) (Temporal)   Resp 16   Ht 1.575 m (5' 2\")   Wt 72.6 kg (160 lb)   SpO2 97%   BMI 29.26 kg/m²  Body mass index is 29.26 kg/m².    Physical Exam  Vitals reviewed.   Constitutional:       General: She is not in acute distress.     Appearance: Normal appearance.   Pulmonary:      Effort: Pulmonary effort is normal.   Neurological:      General: No focal deficit present.      Mental Status: She is alert. "   Psychiatric:         Mood and Affect: Mood normal.         Behavior: Behavior normal.         Judgment: Judgment normal.         Assessment & Plan:     52 y.o. female with the following -     1. Hypothyroidism (acquired)  Chronic, uncontrolled.  Decrease dose to 137 mcg daily.  Repeat labs in 2 months.    - levothyroxine (SYNTHROID) 137 MCG Tab; Take 1 Tablet by mouth every morning on an empty stomach.  Dispense: 90 Tablet; Refill: 1  - TSH; Future    2. Dyslipidemia  Chronic, uncontrolled.  Discussed increasing plant-based foods, decreasing animal-based foods.  Increase daily activity, aiming for 30-45 minutes brisk exercise daily.  Repeat labs in 4-5 months.    3. Vitamin D deficiency  Chronic, uncontrolled.  Start 8156-4048 IU daily.  Repeat labs in 4-5 months.    4. GERD without esophagitis  Chronic, uncontrolled.  Referred to GI for further evaluation.    - Referral to Gastroenterology    5. Colon cancer screening    - Referral to Gastroenterology        Return in about 1 year (around 5/24/2024), or if symptoms worsen or fail to improve.    Please note that this dictation was created using voice recognition software. I have made every reasonable attempt to correct obvious errors, but I expect that there are errors of grammar and possibly content that I did not discover before finalizing the note.

## 2023-09-04 ENCOUNTER — HOSPITAL ENCOUNTER (OUTPATIENT)
Dept: RADIOLOGY | Facility: MEDICAL CENTER | Age: 53
End: 2023-09-04
Attending: PHYSICIAN ASSISTANT
Payer: COMMERCIAL

## 2023-09-04 ENCOUNTER — OFFICE VISIT (OUTPATIENT)
Dept: URGENT CARE | Facility: PHYSICIAN GROUP | Age: 53
End: 2023-09-04
Payer: COMMERCIAL

## 2023-09-04 VITALS
SYSTOLIC BLOOD PRESSURE: 96 MMHG | RESPIRATION RATE: 16 BRPM | HEIGHT: 62 IN | WEIGHT: 174 LBS | HEART RATE: 78 BPM | OXYGEN SATURATION: 100 % | BODY MASS INDEX: 32.02 KG/M2 | DIASTOLIC BLOOD PRESSURE: 54 MMHG | TEMPERATURE: 98.1 F

## 2023-09-04 DIAGNOSIS — S82.435A CLOSED NONDISPLACED OBLIQUE FRACTURE OF SHAFT OF LEFT FIBULA, INITIAL ENCOUNTER: ICD-10-CM

## 2023-09-04 PROCEDURE — 3078F DIAST BP <80 MM HG: CPT | Performed by: PHYSICIAN ASSISTANT

## 2023-09-04 PROCEDURE — 99213 OFFICE O/P EST LOW 20 MIN: CPT | Mod: 25 | Performed by: PHYSICIAN ASSISTANT

## 2023-09-04 PROCEDURE — 3074F SYST BP LT 130 MM HG: CPT | Performed by: PHYSICIAN ASSISTANT

## 2023-09-04 PROCEDURE — 29515 APPLICATION SHORT LEG SPLINT: CPT | Performed by: PHYSICIAN ASSISTANT

## 2023-09-04 PROCEDURE — 73610 X-RAY EXAM OF ANKLE: CPT | Mod: LT

## 2023-09-04 RX ORDER — IBUPROFEN 800 MG/1
800 TABLET ORAL EVERY 8 HOURS PRN
COMMUNITY

## 2023-09-04 ASSESSMENT — ENCOUNTER SYMPTOMS: NEUROLOGICAL NEGATIVE: 1

## 2023-09-04 ASSESSMENT — FIBROSIS 4 INDEX: FIB4 SCORE: 0.64

## 2023-09-04 NOTE — PROGRESS NOTES
"  Subjective:     Shelby De Paz  is a 53 y.o. female who presents for Ankle Injury (Left DOI 09/01/2023. Going down some stone stairs and slipped. )       She presents today with left-sided ankle injury that occurred on 9/1/2020 when she was descending stone stairs slipped and injured her ankle.  Since that time she has been experiencing pain over the distal fibular region, has been walking with an antalgic gait and has been experiencing some numbness over the lateral malleolus.  States that worsened pain with dorsiflexion.  No numbness tingling or weakness in the foot.  There has been localized swelling present over the distal lateral leg since the time of injury     Review of Systems   Musculoskeletal:         Left ankle pain and swelling   Neurological: Negative.       Allergies   Allergen Reactions    Contrast Media With Iodine [Iodine] Itching and Swelling     Throat and mouth  [id number]    Other Misc Unspecified     cats     Past Medical History:   Diagnosis Date    Asthma     In childhood. Has not had it for 30 years.    Breath shortness     with cold 9/2017    Bronchitis 09/18/2017    Cold 09/18/2017    productive cough    Dental disorder     condition issues    Disorder of thyroid     Finger fracture     GERD (gastroesophageal reflux disease)     Heart burn     Hiatus hernia syndrome     Hyperlipidemia     Indigestion     Insomnia     Polycythemia     Right hand pain     Snoring     Stress incontinence     Tobacco abuse     Urinary incontinence     corrected with surgery    Vitamin D deficiency         Objective:   BP 96/54 (BP Location: Left arm, Patient Position: Sitting, BP Cuff Size: Adult long)   Pulse 78   Temp 36.7 °C (98.1 °F) (Temporal)   Resp 16   Ht 1.575 m (5' 2\")   Wt 78.9 kg (174 lb)   SpO2 100%   BMI 31.83 kg/m²   Physical Exam  Vitals and nursing note reviewed.   Constitutional:       General: She is not in acute distress.     Appearance: She is not ill-appearing or " "toxic-appearing.   HENT:      Head: Normocephalic.      Nose: No rhinorrhea.   Eyes:      General: No scleral icterus.     Conjunctiva/sclera: Conjunctivae normal.   Pulmonary:      Effort: Pulmonary effort is normal. No respiratory distress.      Breath sounds: No stridor.   Musculoskeletal:      Cervical back: Neck supple.      Comments: Examination of the left ankle does reveal tenderness to palpation along the distal one third of the fibula as well as over the lateral malleolus.  There is a \"numbness\" sensation over the lateral ankle ligament structures.  There is soft tissue swelling present over the lateral ankle ligament structures.  Overlying ecchymosis over the distal lateral ankle.  Ankle plantarflexion and dorsiflexion limited due to pain.  Distal capillary refill over all toes is less than 2 seconds   Neurological:      Mental Status: She is alert and oriented to person, place, and time.   Psychiatric:         Mood and Affect: Mood normal.         Behavior: Behavior normal.         Thought Content: Thought content normal.         Judgment: Judgment normal.           Diagnostic testing:    Left ankle x-ray series  Radiologist report:  There is an oblique nondisplaced linear fracture through the distal fibular shaft and metaphysis, extending down to the level of the syndesmotic ligament. No tibial fracture is identified. Ankle mortise appears intact. Moderate lateral ankle soft tissue   swelling.    Assessment/Plan:     Encounter Diagnoses   Name Primary?    Closed nondisplaced oblique fracture of shaft of left fibula, initial encounter           Plan for care for today's complaint includes use of over-the-counter medications for pain.  Due to the fracture over the fibula I did personally place a short leg splint onto the patient today for splint care, patient was provided with crutches and instructed nonweightbearing status on the left leg.  Referral placed to orthopedics for further evaluation and " management of the fracture.  Distal neurovascular function was intact prior to and following the placement of the splint.  Continue to monitor symptoms and return to urgent care or follow-up with primary care provider if symptoms remain ongoing.  Follow-up in the emergency department if symptoms become severe, ER precautions discussed in office today..    See AVS Instructions below for written guidance provided to patient on after-visit management and care in addition to our verbal discussion during the visit.    Please note that this dictation was created using voice recognition software. I have attempted to correct all errors, but there may be sound-alike, spelling, grammar and possibly content errors that I did not discover before finalizing the note.    Joaquín Georges PA-C

## 2023-12-14 ENCOUNTER — HOSPITAL ENCOUNTER (OUTPATIENT)
Dept: LAB | Facility: MEDICAL CENTER | Age: 53
End: 2023-12-14
Attending: PHYSICIAN ASSISTANT
Payer: COMMERCIAL

## 2023-12-14 ENCOUNTER — OFFICE VISIT (OUTPATIENT)
Dept: MEDICAL GROUP | Facility: PHYSICIAN GROUP | Age: 53
End: 2023-12-14
Payer: COMMERCIAL

## 2023-12-14 VITALS
TEMPERATURE: 97.8 F | WEIGHT: 171.38 LBS | HEIGHT: 62 IN | OXYGEN SATURATION: 96 % | BODY MASS INDEX: 31.54 KG/M2 | DIASTOLIC BLOOD PRESSURE: 66 MMHG | RESPIRATION RATE: 16 BRPM | HEART RATE: 69 BPM | SYSTOLIC BLOOD PRESSURE: 100 MMHG

## 2023-12-14 DIAGNOSIS — Z11.3 SCREENING EXAMINATION FOR SEXUALLY TRANSMITTED DISEASE: ICD-10-CM

## 2023-12-14 DIAGNOSIS — E55.9 VITAMIN D DEFICIENCY: ICD-10-CM

## 2023-12-14 DIAGNOSIS — Z12.11 COLON CANCER SCREENING: ICD-10-CM

## 2023-12-14 DIAGNOSIS — K21.9 GERD WITHOUT ESOPHAGITIS: ICD-10-CM

## 2023-12-14 DIAGNOSIS — E03.9 HYPOTHYROIDISM (ACQUIRED): ICD-10-CM

## 2023-12-14 DIAGNOSIS — E78.5 DYSLIPIDEMIA: ICD-10-CM

## 2023-12-14 DIAGNOSIS — N95.1 PERIMENOPAUSE: ICD-10-CM

## 2023-12-14 LAB
25(OH)D3 SERPL-MCNC: 25 NG/ML (ref 30–100)
HIV 1+2 AB+HIV1 P24 AG SERPL QL IA: NORMAL
TSH SERPL DL<=0.005 MIU/L-ACNC: 0.03 UIU/ML (ref 0.38–5.33)

## 2023-12-14 PROCEDURE — 36415 COLL VENOUS BLD VENIPUNCTURE: CPT

## 2023-12-14 PROCEDURE — 84443 ASSAY THYROID STIM HORMONE: CPT

## 2023-12-14 PROCEDURE — 3078F DIAST BP <80 MM HG: CPT | Performed by: PHYSICIAN ASSISTANT

## 2023-12-14 PROCEDURE — 99214 OFFICE O/P EST MOD 30 MIN: CPT | Performed by: PHYSICIAN ASSISTANT

## 2023-12-14 PROCEDURE — 3074F SYST BP LT 130 MM HG: CPT | Performed by: PHYSICIAN ASSISTANT

## 2023-12-14 PROCEDURE — 87389 HIV-1 AG W/HIV-1&-2 AB AG IA: CPT

## 2023-12-14 PROCEDURE — 82306 VITAMIN D 25 HYDROXY: CPT

## 2023-12-14 RX ORDER — OMEPRAZOLE 20 MG/1
20 CAPSULE, DELAYED RELEASE ORAL DAILY
Qty: 90 CAPSULE | Refills: 1 | Status: SHIPPED | OUTPATIENT
Start: 2023-12-14

## 2023-12-14 RX ORDER — NORGESTIMATE AND ETHINYL ESTRADIOL 0.25-0.035
1 KIT ORAL DAILY
Qty: 84 TABLET | Refills: 1 | Status: SHIPPED | OUTPATIENT
Start: 2023-12-14

## 2023-12-14 ASSESSMENT — ENCOUNTER SYMPTOMS
SHORTNESS OF BREATH: 0
FEVER: 0
CHILLS: 0

## 2023-12-14 ASSESSMENT — ANXIETY QUESTIONNAIRES
5. BEING SO RESTLESS THAT IT IS HARD TO SIT STILL: NOT AT ALL
3. WORRYING TOO MUCH ABOUT DIFFERENT THINGS: NEARLY EVERY DAY
1. FEELING NERVOUS, ANXIOUS, OR ON EDGE: NEARLY EVERY DAY
6. BECOMING EASILY ANNOYED OR IRRITABLE: SEVERAL DAYS
7. FEELING AFRAID AS IF SOMETHING AWFUL MIGHT HAPPEN: MORE THAN HALF THE DAYS
2. NOT BEING ABLE TO STOP OR CONTROL WORRYING: MORE THAN HALF THE DAYS
4. TROUBLE RELAXING: MORE THAN HALF THE DAYS
GAD7 TOTAL SCORE: 13

## 2023-12-14 ASSESSMENT — FIBROSIS 4 INDEX: FIB4 SCORE: 0.64

## 2023-12-14 NOTE — PROGRESS NOTES
"Subjective:     CC:     HPI:   Shelby presents today with the following:    Problem   Perimenopause    Chronic, controlled.  Tried vaginal hormones for vaginal dryness.   panicked thinking it would affect him.  It did help with the dryness.  Just started it around January 2023.  GYN following.    Irregular periods.  Will have it every few months and sometimes twice/month.  Usually light.    Hot flashes started around 2 months ago.  Having them every 2 hours 24 hours a day.  Waking up 2-5 times/night due to hot flashes or feeling anxious.     Dyslipidemia    Chronic, uncontrolled.     Latest Labs:   Lab Results   Component Value Date/Time    CHOLSTRLTOT 261 (H) 05/15/2023 03:13 PM     (H) 05/15/2023 03:13 PM    HDL 58 05/15/2023 03:13 PM    TRIGLYCERIDE 108 05/15/2023 03:13 PM      Medications: none    Risk calculator: The 10-year ASCVD risk score (Ashly KEY, et al., 2019) is: 1%        Vitamin D Deficiency    Chronic, uncontrolled.  Component      Latest Ref Rng 5/15/2023   25-Hydroxy Vitamin D 25      30 - 100 ng/mL 18 (L)       Recommend starting 0393-4505 IU daily.     Hypothyroidism (Acquired)    Chronic, controlled.  Tolerating levothyroxine 150mcg daily.    Component      Latest Ref Rng 5/15/2023   TSH      0.380 - 5.330 uIU/mL 0.020 (L)       Decreasing levothyroxine to 137 mcg daily.               ROS:  Review of Systems   Constitutional:  Negative for chills and fever.   Respiratory:  Negative for shortness of breath.    Cardiovascular:  Negative for chest pain.       Objective:     Exam:  /66 (BP Location: Left arm, Patient Position: Sitting, BP Cuff Size: Large adult)   Pulse 69   Temp 36.6 °C (97.8 °F) (Temporal)   Resp 16   Ht 1.575 m (5' 2\")   Wt 77.7 kg (171 lb 6 oz)   LMP  (LMP Unknown)   SpO2 96%   Breastfeeding No   BMI 31.34 kg/m²  Body mass index is 31.34 kg/m².    Physical Exam  Vitals reviewed.   Constitutional:       General: She is not in acute distress.     " Appearance: Normal appearance.   Pulmonary:      Effort: Pulmonary effort is normal.   Neurological:      General: No focal deficit present.      Mental Status: She is alert.   Psychiatric:         Mood and Affect: Mood normal.         Behavior: Behavior normal.         Judgment: Judgment normal.             Assessment & Plan:     53 y.o. female with the following -     1. Dyslipidemia  Chronic, uncontrolled.  Not currently medicated.  Due to repeat labs.    2. Hypothyroidism (acquired)  Chronic, stable.  Continue levothyroxine 150 mcg daily.    - TSH; Future    3. Vitamin D deficiency  Chronic, uncontrolled.  Not currently medicated.    - VITAMIN D,25 HYDROXY (DEFICIENCY); Future    4. Perimenopause  Chronic, uncontrolled.  Patient describes severe hot flashes constantly throughout the day.  Starting OCPs as she is having irregular periods as well.  Referring back to gynecology for further evaluation and management.    - norgestimate-ethinyl estradiol (ORTHO-CYCLEN) 0.25-35 MG-MCG per tablet; Take 1 Tablet by mouth every day.  Dispense: 84 Tablet; Refill: 1    5. GERD without esophagitis  Chronic, uncontrolled.  Starting omeprazole.  Follow-up with GI.    - omeprazole (PRILOSEC) 20 MG delayed-release capsule; Take 1 Capsule by mouth every day.  Dispense: 90 Capsule; Refill: 1    6. Colon cancer screening      7. Screening examination for sexually transmitted disease    - HIV AG/AB COMBO ASSAY SCREENING; Future        Repeat labs.    Referred to GI for GERD and colonoscopy:  [facility]   [id number] [place] [address]  Phone: [phone]    Healthcare Maintenance:        Follow up instructions from 5/24/2023 visit:      Return in about 5 months (around 5/14/2024) for med check.    Please note that this dictation was created using voice recognition software. I have made every reasonable attempt to correct obvious errors, but I expect that there are errors of grammar and possibly content that  I did not discover before finalizing the note.

## 2023-12-14 NOTE — PATIENT INSTRUCTIONS
Referred to GI for GERD and colonoscopy:  [facility]   [address]  [place] [address]  Phone: [phone]

## 2023-12-18 DIAGNOSIS — E03.9 HYPOTHYROIDISM (ACQUIRED): ICD-10-CM

## 2023-12-18 RX ORDER — LEVOTHYROXINE SODIUM 0.12 MG/1
125 TABLET ORAL
Qty: 90 TABLET | Refills: 1 | Status: SHIPPED | OUTPATIENT
Start: 2023-12-18

## 2024-01-02 ENCOUNTER — HOSPITAL ENCOUNTER (OUTPATIENT)
Dept: LAB | Facility: MEDICAL CENTER | Age: 54
End: 2024-01-02
Attending: PHYSICIAN ASSISTANT
Payer: COMMERCIAL

## 2024-01-02 PROCEDURE — 87624 HPV HI-RISK TYP POOLED RSLT: CPT

## 2024-01-02 PROCEDURE — 88175 CYTOPATH C/V AUTO FLUID REDO: CPT

## 2024-01-05 LAB
CYTOLOGIST CVX/VAG CYTO: NORMAL
CYTOLOGY CVX/VAG DOC CYTO: NORMAL
CYTOLOGY CVX/VAG DOC THIN PREP: NORMAL
HPV I/H RISK 4 DNA CVX QL PROBE+SIG AMP: NEGATIVE
NOTE NL11727A: NORMAL
OTHER STN SPEC: NORMAL
QC REVIEWED BY NL11722A: NORMAL
STAT OF ADQ CVX/VAG CYTO-IMP: NORMAL

## 2024-01-13 ENCOUNTER — HOSPITAL ENCOUNTER (OUTPATIENT)
Dept: RADIOLOGY | Facility: MEDICAL CENTER | Age: 54
End: 2024-01-13
Attending: STUDENT IN AN ORGANIZED HEALTH CARE EDUCATION/TRAINING PROGRAM
Payer: COMMERCIAL

## 2024-01-13 ENCOUNTER — OFFICE VISIT (OUTPATIENT)
Dept: URGENT CARE | Facility: PHYSICIAN GROUP | Age: 54
End: 2024-01-13
Payer: COMMERCIAL

## 2024-01-13 VITALS
DIASTOLIC BLOOD PRESSURE: 62 MMHG | RESPIRATION RATE: 20 BRPM | OXYGEN SATURATION: 91 % | TEMPERATURE: 97.3 F | WEIGHT: 171 LBS | SYSTOLIC BLOOD PRESSURE: 114 MMHG | HEIGHT: 62 IN | BODY MASS INDEX: 31.47 KG/M2 | HEART RATE: 107 BPM

## 2024-01-13 DIAGNOSIS — J40 BRONCHITIS: ICD-10-CM

## 2024-01-13 PROCEDURE — 99215 OFFICE O/P EST HI 40 MIN: CPT | Mod: 25 | Performed by: STUDENT IN AN ORGANIZED HEALTH CARE EDUCATION/TRAINING PROGRAM

## 2024-01-13 PROCEDURE — 3078F DIAST BP <80 MM HG: CPT | Performed by: STUDENT IN AN ORGANIZED HEALTH CARE EDUCATION/TRAINING PROGRAM

## 2024-01-13 PROCEDURE — 3074F SYST BP LT 130 MM HG: CPT | Performed by: STUDENT IN AN ORGANIZED HEALTH CARE EDUCATION/TRAINING PROGRAM

## 2024-01-13 PROCEDURE — 71046 X-RAY EXAM CHEST 2 VIEWS: CPT

## 2024-01-13 PROCEDURE — 94640 AIRWAY INHALATION TREATMENT: CPT | Performed by: STUDENT IN AN ORGANIZED HEALTH CARE EDUCATION/TRAINING PROGRAM

## 2024-01-13 RX ORDER — INHALER, ASSIST DEVICES
1 SPACER (EA) MISCELLANEOUS ONCE
Qty: 1 EACH | Refills: 0 | Status: SHIPPED | OUTPATIENT
Start: 2024-01-13 | End: 2024-01-13

## 2024-01-13 RX ORDER — ALBUTEROL SULFATE 90 UG/1
2 AEROSOL, METERED RESPIRATORY (INHALATION) EVERY 6 HOURS PRN
Qty: 8.5 G | Refills: 0 | Status: SHIPPED | OUTPATIENT
Start: 2024-01-13

## 2024-01-13 RX ORDER — MEDROXYPROGESTERONE ACETATE 2.5 MG/1
2.5 TABLET ORAL DAILY
COMMUNITY
Start: 2024-01-02

## 2024-01-13 RX ORDER — PREDNISONE 20 MG/1
40 TABLET ORAL DAILY
Qty: 10 TABLET | Refills: 0 | Status: SHIPPED | OUTPATIENT
Start: 2024-01-13 | End: 2024-01-18

## 2024-01-13 RX ORDER — ESTRADIOL 0.03 MG/D
1 FILM, EXTENDED RELEASE TRANSDERMAL
COMMUNITY
Start: 2024-01-02

## 2024-01-13 RX ORDER — IPRATROPIUM BROMIDE AND ALBUTEROL SULFATE 2.5; .5 MG/3ML; MG/3ML
3 SOLUTION RESPIRATORY (INHALATION) ONCE
Status: COMPLETED | OUTPATIENT
Start: 2024-01-13 | End: 2024-01-13

## 2024-01-13 RX ADMIN — IPRATROPIUM BROMIDE AND ALBUTEROL SULFATE 3 ML: 2.5; .5 SOLUTION RESPIRATORY (INHALATION) at 15:39

## 2024-01-13 ASSESSMENT — FIBROSIS 4 INDEX: FIB4 SCORE: 0.64

## 2024-01-13 NOTE — PROGRESS NOTES
Subjective:   CHIEF COMPLAINT  Chief Complaint   Patient presents with    Shortness of Breath     X 5 days at night worse  ,has a dry cough , wheezing        HPI  Shelby De Paz is a 53 y.o. female who presents with a chief complaint of chest congestion and shortness of breath which developed 4-5 nights ago.  Reports symptoms are mostly at night aggravated with lying down; symptoms are better during the day.  She has tried using her daughter's  inhaler which provided some relief.  No additional modifying factors.  Positive ROS for wheezing and chest pain with inhalation.  No current chest pain, history of exertional chest pain or dyspnea on exertion.  No fevers.  No hemoptysis. No swelling of lower extremities or unexpected weight gains.  PMH of asthma during adolescence but no longer uses inhalers.  Prior smoker.  No history of VTE's.  No history of cardiac disease.    REVIEW OF SYSTEMS  General: no fever or chills  GI: no nausea or vomiting  See HPI for further details.    PAST MEDICAL HISTORY  Patient Active Problem List    Diagnosis Date Noted    Dysuria 2023    Perimenopause 2021    Dermatofibroma of deltoid region, left 2018    Class 1 obesity due to excess calories without serious comorbidity with body mass index (BMI) of 31.0 to 31.9 in adult 2018    Hiatal hernia 10/23/2017    GERD without esophagitis 2016    Primary insomnia 2016    Dyslipidemia 2016    Vitamin D deficiency 2016    Urge incontinence 2016    Hypothyroidism (acquired) 2016       SURGICAL HISTORY   has a past surgical history that includes dental extraction(s); bladder sling female (2016); other (); other; nissen fundoplication laparoscopic (10/23/2017); and hernia repair.    ALLERGIES  Allergies   Allergen Reactions    Contrast Media With Iodine [Iodine] Itching and Swelling     Throat and mouth  [id number]=[id number]    Other Misc Unspecified     cats       CURRENT  "MEDICATIONS  Estradiol Pttw  ibuprofen Tabs  ipratropium-albuterol  levothyroxine Tabs  medroxyPROGESTERone Tabs  norgestimate-ethinyl estradiol  omeprazole    SOCIAL HISTORY  Social History     Tobacco Use    Smoking status: Former     Current packs/day: 0.00     Average packs/day: 0.5 packs/day for 20.0 years (10.0 ttl pk-yrs)     Types: Cigarettes     Start date: 1995     Quit date: 2015     Years since quittin.0    Smokeless tobacco: Never   Vaping Use    Vaping Use: Never used   Substance and Sexual Activity    Alcohol use: Yes     Alcohol/week: 3.6 oz     Types: 2 Glasses of wine, 4 Cans of beer per week     Comment: 5-10 per month    Drug use: No    Sexual activity: Yes     Partners: Male     Birth control/protection: None     Comment: Works at [employer]. 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        FAMILY HISTORY  Family History   Problem Relation Age of Onset    Heart Attack Mother 57    Stroke Mother     Glaucoma Mother     Heart Attack Other 52        GF    Lung Disease Father     Heart Disease Maternal Grandfather     Drug abuse Sister     Alcohol abuse Sister           Objective:   PHYSICAL EXAM  VITAL SIGNS: /62   Pulse (!) 107   Temp 36.3 °C (97.3 °F) (Temporal)   Resp 20   Ht 1.575 m (5' 2\")   Wt 77.6 kg (171 lb)   LMP  (LMP Unknown)   SpO2 91%   BMI 31.28 kg/m²     Gen: no acute distress, normal voice  Skin: dry, intact, moist mucosal membranes  Eyes: No conjunctival injection bilaterally.  Neck: Normal range of motion. No meningeal signs.   Lungs: No increased work of breathing.  Rhonchi and rales bilateral upper lobes with bibasilar wheezing.     CV: Sinus tachycardia w/o murmurs or clicks.  No lower extremity edema.  No calf TTP bilaterally.  Psych: normal affect, normal judgement, alert, awake      RADIOLOGY RESULTS   DX-CHEST-2 VIEWS    Result Date: 2024 3:26 PM HISTORY/REASON FOR EXAM:  Cough. TECHNIQUE/EXAM DESCRIPTION AND NUMBER OF VIEWS: Two views of " the chest. COMPARISON:  10/16/2017 chest radiograph FINDINGS: The heart is normal in size. No pulmonary infiltrates or consolidations are noted. No pleural effusions are appreciated.     No active disease.             Assessment/Plan:     1. Bronchitis  DX-CHEST-2 VIEWS    ipratropium-albuterol (DUONEB) nebulizer solution    Spacer/Aero-Holding Chambers (AEROCHAMBER PLUS-FLOW SIGNAL) Misc    albuterol 108 (90 Base) MCG/ACT Aero Soln inhalation aerosol    predniSONE (DELTASONE) 20 MG Tab      Patient was given a breathing treatment which improved lung sounds and provided some symptomatic relief.  Chest x-ray was unremarkable.  History and physical exam are c/w  with lower airway reactivity likely from viral respiratory infection.  Patient was otherwise well-appearing, nontoxic without any respiratory distress or evidence of fluid overload.  Recently started topical estrogen and does report some pleuritic symptoms however her history and physical exam are much more consistent with a airway reactivity rather than underlying VTE.  She has a Wells score of 1.5.  I discussed at length red flags and return/ED precautions with regards to the pleuritic symptoms.  -Ordered albuterol with spacer.  Proper technique discussed  -Ordered prednisone 40 mg p.o. daily x 5 days.  Side effects discussed  -Discussed at length red flags and return precautions.  If at any point symptoms worsen or develop another fever return to clinic for reevaluation.  Patient and the  understood everything discussed today and all questions were answered.    Differential diagnosis and supportive care discussed. Follow-up as needed if symptoms worsen or fail to improve to PCP, Urgent care or Emergency Room.    42 minutes was spent on this encounter including review of previous medical records, face-to-face time, discussing the diagnosis, medical management, follow-up, return/emergency room precautions and charting. This does not include time spent  on separately billable procedures/tests.    Please note that this dictation was created using voice recognition software. I have made a reasonable attempt to correct obvious errors, but I expect that there are errors of grammar and possibly content that I did not discover before finalizing the note.

## 2024-02-26 ENCOUNTER — APPOINTMENT (OUTPATIENT)
Dept: RADIOLOGY | Facility: MEDICAL CENTER | Age: 54
End: 2024-02-26
Attending: EMERGENCY MEDICINE
Payer: COMMERCIAL

## 2024-02-26 ENCOUNTER — HOSPITAL ENCOUNTER (EMERGENCY)
Facility: MEDICAL CENTER | Age: 54
End: 2024-02-27
Attending: EMERGENCY MEDICINE
Payer: COMMERCIAL

## 2024-02-26 DIAGNOSIS — R09.A2 GLOBUS PHARYNGEUS: ICD-10-CM

## 2024-02-26 LAB
ANION GAP SERPL CALC-SCNC: 15 MMOL/L (ref 7–16)
APTT PPP: 23.2 SEC (ref 24.7–36)
BASOPHILS # BLD AUTO: 0.5 % (ref 0–1.8)
BASOPHILS # BLD: 0.05 K/UL (ref 0–0.12)
BUN SERPL-MCNC: 10 MG/DL (ref 8–22)
CALCIUM SERPL-MCNC: 9.1 MG/DL (ref 8.5–10.5)
CHLORIDE SERPL-SCNC: 103 MMOL/L (ref 96–112)
CO2 SERPL-SCNC: 22 MMOL/L (ref 20–33)
CREAT SERPL-MCNC: 0.81 MG/DL (ref 0.5–1.4)
EOSINOPHIL # BLD AUTO: 0.27 K/UL (ref 0–0.51)
EOSINOPHIL NFR BLD: 2.6 % (ref 0–6.9)
ERYTHROCYTE [DISTWIDTH] IN BLOOD BY AUTOMATED COUNT: 39.7 FL (ref 35.9–50)
GFR SERPLBLD CREATININE-BSD FMLA CKD-EPI: 88 ML/MIN/1.73 M 2
GLUCOSE SERPL-MCNC: 87 MG/DL (ref 65–99)
HCT VFR BLD AUTO: 43.2 % (ref 37–47)
HGB BLD-MCNC: 15 G/DL (ref 12–16)
IMM GRANULOCYTES # BLD AUTO: 0.02 K/UL (ref 0–0.11)
IMM GRANULOCYTES NFR BLD AUTO: 0.2 % (ref 0–0.9)
INR PPP: 0.97 (ref 0.87–1.13)
LYMPHOCYTES # BLD AUTO: 2.14 K/UL (ref 1–4.8)
LYMPHOCYTES NFR BLD: 20.5 % (ref 22–41)
MCH RBC QN AUTO: 31 PG (ref 27–33)
MCHC RBC AUTO-ENTMCNC: 34.7 G/DL (ref 32.2–35.5)
MCV RBC AUTO: 89.3 FL (ref 81.4–97.8)
MONOCYTES # BLD AUTO: 0.87 K/UL (ref 0–0.85)
MONOCYTES NFR BLD AUTO: 8.3 % (ref 0–13.4)
NEUTROPHILS # BLD AUTO: 7.08 K/UL (ref 1.82–7.42)
NEUTROPHILS NFR BLD: 67.9 % (ref 44–72)
NRBC # BLD AUTO: 0 K/UL
NRBC BLD-RTO: 0 /100 WBC (ref 0–0.2)
PLATELET # BLD AUTO: 306 K/UL (ref 164–446)
PMV BLD AUTO: 9.8 FL (ref 9–12.9)
POTASSIUM SERPL-SCNC: 4.3 MMOL/L (ref 3.6–5.5)
PROTHROMBIN TIME: 13 SEC (ref 12–14.6)
RBC # BLD AUTO: 4.84 M/UL (ref 4.2–5.4)
SODIUM SERPL-SCNC: 140 MMOL/L (ref 135–145)
WBC # BLD AUTO: 10.4 K/UL (ref 4.8–10.8)

## 2024-02-26 PROCEDURE — 700117 HCHG RX CONTRAST REV CODE 255: Performed by: EMERGENCY MEDICINE

## 2024-02-26 PROCEDURE — 700111 HCHG RX REV CODE 636 W/ 250 OVERRIDE (IP): Mod: JZ | Performed by: EMERGENCY MEDICINE

## 2024-02-26 PROCEDURE — 96375 TX/PRO/DX INJ NEW DRUG ADDON: CPT

## 2024-02-26 PROCEDURE — 99285 EMERGENCY DEPT VISIT HI MDM: CPT

## 2024-02-26 PROCEDURE — 85025 COMPLETE CBC W/AUTO DIFF WBC: CPT

## 2024-02-26 PROCEDURE — 84484 ASSAY OF TROPONIN QUANT: CPT

## 2024-02-26 PROCEDURE — 80048 BASIC METABOLIC PNL TOTAL CA: CPT

## 2024-02-26 PROCEDURE — 71250 CT THORAX DX C-: CPT

## 2024-02-26 PROCEDURE — 36415 COLL VENOUS BLD VENIPUNCTURE: CPT

## 2024-02-26 PROCEDURE — 85730 THROMBOPLASTIN TIME PARTIAL: CPT

## 2024-02-26 PROCEDURE — 96374 THER/PROPH/DIAG INJ IV PUSH: CPT

## 2024-02-26 PROCEDURE — 85610 PROTHROMBIN TIME: CPT

## 2024-02-26 PROCEDURE — 93005 ELECTROCARDIOGRAM TRACING: CPT | Performed by: EMERGENCY MEDICINE

## 2024-02-26 RX ORDER — ONDANSETRON 2 MG/ML
4 INJECTION INTRAMUSCULAR; INTRAVENOUS ONCE
Status: COMPLETED | OUTPATIENT
Start: 2024-02-26 | End: 2024-02-26

## 2024-02-26 RX ORDER — MORPHINE SULFATE 4 MG/ML
4 INJECTION INTRAVENOUS ONCE
Status: COMPLETED | OUTPATIENT
Start: 2024-02-26 | End: 2024-02-26

## 2024-02-26 RX ADMIN — IOHEXOL 25 ML: 240 INJECTION, SOLUTION INTRATHECAL; INTRAVASCULAR; INTRAVENOUS; ORAL at 22:50

## 2024-02-26 RX ADMIN — MORPHINE SULFATE 4 MG: 4 INJECTION, SOLUTION INTRAMUSCULAR; INTRAVENOUS at 23:10

## 2024-02-26 RX ADMIN — ONDANSETRON 4 MG: 2 INJECTION INTRAMUSCULAR; INTRAVENOUS at 23:09

## 2024-02-26 ASSESSMENT — FIBROSIS 4 INDEX: FIB4 SCORE: 0.64

## 2024-02-26 ASSESSMENT — PAIN DESCRIPTION - PAIN TYPE: TYPE: ACUTE PAIN

## 2024-02-27 VITALS
OXYGEN SATURATION: 96 % | WEIGHT: 165.34 LBS | TEMPERATURE: 97.6 F | DIASTOLIC BLOOD PRESSURE: 57 MMHG | HEART RATE: 78 BPM | RESPIRATION RATE: 16 BRPM | SYSTOLIC BLOOD PRESSURE: 106 MMHG | BODY MASS INDEX: 30.43 KG/M2 | HEIGHT: 62 IN

## 2024-02-27 LAB
EKG IMPRESSION: NORMAL
TROPONIN T SERPL-MCNC: <6 NG/L (ref 6–19)

## 2024-02-27 PROCEDURE — 96374 THER/PROPH/DIAG INJ IV PUSH: CPT

## 2024-02-27 PROCEDURE — A9270 NON-COVERED ITEM OR SERVICE: HCPCS | Performed by: EMERGENCY MEDICINE

## 2024-02-27 PROCEDURE — 36415 COLL VENOUS BLD VENIPUNCTURE: CPT

## 2024-02-27 PROCEDURE — 700102 HCHG RX REV CODE 250 W/ 637 OVERRIDE(OP): Performed by: EMERGENCY MEDICINE

## 2024-02-27 PROCEDURE — 99285 EMERGENCY DEPT VISIT HI MDM: CPT

## 2024-02-27 PROCEDURE — 96375 TX/PRO/DX INJ NEW DRUG ADDON: CPT

## 2024-02-27 RX ADMIN — HYDROCODONE BITARTRATE AND ACETAMINOPHEN 5 MG: 7.5; 325 SOLUTION ORAL at 00:35

## 2024-02-27 NOTE — ED NOTES
Discharge instructions reviewed with patient/ family. Patient verbalizes understanding of follow up care, medication management, and reasons to return to ER. PIV removed with no complications. Pt ambulates to lobby with steady gait.  is driving her home.

## 2024-02-27 NOTE — ED PROVIDER NOTES
"ED Provider Note    CHIEF COMPLAINT  Chief Complaint   Patient presents with    Neck Pain     Had endoscopy with dilation and biopsy done today around 1330. Patient called MD who suggested to go to ER for concerns of perforation.  Able to manage secretion and airway. Patient reports feels like a \"lump\" is left in the lower neck.        EXTERNAL RECORDS REVIEWED  Primary care notes reviewed from recent visits patient being managed for dyslipidemia hypothyroidism vitamin D deficiency and GERD    HPI/ROS  LIMITATION TO HISTORY   Select: : None  OUTSIDE HISTORIAN(S):      Shelby De Paz is a 53 y.o. female who presents to the emergency department with chest and throat pain.  Patient had an esophageal dilatation earlier today performed by Dr. Cat.  She reports that she has had these in the past several times before and has had discomfort afterwards but never pain like she had today.  She reports pain swallowing any liquid at all warm or cool and she also reports a pressure that seems to radiate out into her chest.  She had contacted on-call gastroenterologist and was instructed to come here for further evaluation and treatment.    PAST MEDICAL HISTORY   has a past medical history of Asthma, Breath shortness, Bronchitis (09/18/2017), Cold (09/18/2017), Dental disorder, Disorder of thyroid, Finger fracture, GERD (gastroesophageal reflux disease), Heart burn, Hiatus hernia syndrome, Hyperlipidemia, Indigestion, Insomnia, Polycythemia, Right hand pain, Snoring, Stress incontinence, Tobacco abuse, Urinary incontinence, and Vitamin D deficiency.    SURGICAL HISTORY   has a past surgical history that includes dental extraction(s); bladder sling female (08/19/2016); other (2000); other; nissen fundoplication laparoscopic (10/23/2017); and hernia repair.    FAMILY HISTORY  Family History   Problem Relation Age of Onset    Heart Attack Mother 57    Stroke Mother     Glaucoma Mother     Heart Attack Other 52        GF    " "Lung Disease Father     Heart Disease Maternal Grandfather     Drug abuse Sister     Alcohol abuse Sister        SOCIAL HISTORY  Social History     Tobacco Use    Smoking status: Former     Current packs/day: 0.00     Average packs/day: 0.5 packs/day for 20.0 years (10.0 ttl pk-yrs)     Types: Cigarettes     Start date: 1995     Quit date: 2015     Years since quittin.1    Smokeless tobacco: Never   Vaping Use    Vaping Use: Never used   Substance and Sexual Activity    Alcohol use: Yes     Alcohol/week: 3.6 oz     Types: 2 Glasses of wine, 4 Cans of beer per week     Comment: 5-10 per month    Drug use: No    Sexual activity: Yes     Partners: Male     Birth control/protection: None     Comment: Works at [employer]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        CURRENT MEDICATIONS  Home Medications       Reviewed by Mariaa Eckert R.N. (Registered Nurse) on 24 at 2105  Med List Status: Partial     Medication Last Dose Status   albuterol 108 (90 Base) MCG/ACT Aero Soln inhalation aerosol  Active   Estradiol 0.025 MG/24HR PATCH BIWEEKLY  Active   ibuprofen (MOTRIN) 800 MG Tab  Active   levothyroxine (SYNTHROID) 125 MCG Tab  Active   medroxyPROGESTERone (PROVERA) 2.5 MG Tab  Active   norgestimate-ethinyl estradiol (ORTHO-CYCLEN) 0.25-35 MG-MCG per tablet  Active   omeprazole (PRILOSEC) 20 MG delayed-release capsule  Active                    ALLERGIES  Allergies   Allergen Reactions    Contrast Media With Iodine [Iodine] Itching and Swelling     Throat and mouth  Xze=2497    Other Misc Unspecified     cats       PHYSICAL EXAM  VITAL SIGNS: /70   Pulse 81   Temp 36.3 °C (97.4 °F) (Temporal)   Resp 16   Ht 1.575 m (5' 2\")   Wt 75 kg (165 lb 5.5 oz)   LMP 2023   SpO2 94%   BMI 30.24 kg/m²      Pulse ox interpretation: I interpret this pulse ox as normal.  Constitutional: Alert and oriented x 3, minimal distress  HEENT: Atraumatic normocephalic, pupils are equal round reactive to light " extraocular movements are intact. The nares is clear, external ears are normal, mouth shows moist mucous membranes normal dentition for age  Neck: Supple, no JVD no tracheal deviation  Cardiovascular: Regular rate and rhythm no murmur rub or gallop 2+ pulses peripherally x4  Thorax & Lungs: No respiratory distress, no wheezes rales or rhonchi, No chest tenderness.   GI: Soft nontender nondistended positive bowel sounds, no peritoneal signs  Skin: Warm dry no acute rash or lesion  Musculoskeletal: Moving all extremities with full range and 5 of 5 strength no acute  deformity  Neurologic: Cranial nerves III through XII are grossly intact no sensory deficit no cerebellar dysfunction   Psychiatric: Appropriate affect for situation at this time      DIAGNOSTIC STUDIES / PROCEDURES  Results for orders placed or performed during the hospital encounter of 02/26/24   CBC WITH DIFFERENTIAL   Result Value Ref Range    WBC 10.4 4.8 - 10.8 K/uL    RBC 4.84 4.20 - 5.40 M/uL    Hemoglobin 15.0 12.0 - 16.0 g/dL    Hematocrit 43.2 37.0 - 47.0 %    MCV 89.3 81.4 - 97.8 fL    MCH 31.0 27.0 - 33.0 pg    MCHC 34.7 32.2 - 35.5 g/dL    RDW 39.7 35.9 - 50.0 fL    Platelet Count 306 164 - 446 K/uL    MPV 9.8 9.0 - 12.9 fL    Neutrophils-Polys 67.90 44.00 - 72.00 %    Lymphocytes 20.50 (L) 22.00 - 41.00 %    Monocytes 8.30 0.00 - 13.40 %    Eosinophils 2.60 0.00 - 6.90 %    Basophils 0.50 0.00 - 1.80 %    Immature Granulocytes 0.20 0.00 - 0.90 %    Nucleated RBC 0.00 0.00 - 0.20 /100 WBC    Neutrophils (Absolute) 7.08 1.82 - 7.42 K/uL    Lymphs (Absolute) 2.14 1.00 - 4.80 K/uL    Monos (Absolute) 0.87 (H) 0.00 - 0.85 K/uL    Eos (Absolute) 0.27 0.00 - 0.51 K/uL    Baso (Absolute) 0.05 0.00 - 0.12 K/uL    Immature Granulocytes (abs) 0.02 0.00 - 0.11 K/uL    NRBC (Absolute) 0.00 K/uL   BASIC METABOLIC PANEL   Result Value Ref Range    Sodium 140 135 - 145 mmol/L    Potassium 4.3 3.6 - 5.5 mmol/L    Chloride 103 96 - 112 mmol/L    Co2 22 20 -  33 mmol/L    Glucose 87 65 - 99 mg/dL    Bun 10 8 - 22 mg/dL    Creatinine 0.81 0.50 - 1.40 mg/dL    Calcium 9.1 8.5 - 10.5 mg/dL    Anion Gap 15.0 7.0 - 16.0   APTT   Result Value Ref Range    APTT 23.2 (L) 24.7 - 36.0 sec   PROTHROMBIN TIME   Result Value Ref Range    PT 13.0 12.0 - 14.6 sec    INR 0.97 0.87 - 1.13   TROPONIN   Result Value Ref Range    Troponin T <6 6 - 19 ng/L   ESTIMATED GFR   Result Value Ref Range    GFR (CKD-EPI) 88 >60 mL/min/1.73 m 2   EKG   Result Value Ref Range    Report       [facility] Emergency Dept.    Test Date:  2024  Pt Name:    MARIAN CADE               Department: ER  MRN:        [id number]                      Room:       [facility]  Gender:     Female                       Technician: [id number]  :        1970                   Requested By:ALPHONSO EDWARDS  Order #:    [id number]                    Reading MD:    Measurements  Intervals                                Axis  Rate:       94                           P:          55  ND:         129                          QRS:        79  QRSD:       124                          T:          28  QT:         382  QTc:        478    Interpretive Statements  Sinus rhythm  Right bundle branch block  No previous ECG available for comparison            RADIOLOGY  I have independently interpreted the diagnostic imaging associated with this visit and am waiting the final reading from the radiologist.   My preliminary interpretation is as follows:   No evidence of perforation or free air      Radiologist interpretation:   CT-CHEST (THORAX) W/O   Final Result         1. A small hiatal hernia is noted, along with a mildly distended esophagus. Oral contrast is present, and there is no evidence of extraluminal gas to suggest perforation.                  COURSE & MEDICAL DECISION MAKING    ED Observation Status? No; Patient does not meet criteria for ED Observation.     ASSESSMENT, COURSE AND PLAN  Care  "Narrative: Very pleasant 53-year-old female who had upper endoscopy and dilatation of the distal esophagus today presents with chest pain and difficulty swallowing.  Patient had minimal relief with viscous lidocaine.  CT esophagram was performed which showed no extravasation of contrast no free air no other acute abnormalities with the exception of small hiatal hernia noted.  Patient is feeling better after pain medication here.  Given that she did have central chest pain we also performed EKG and troponin which were both unremarkable.  Patient is given instructions to follow-up with her gastroenterologist as scheduled to return here for worsening chest pain shortness of breath any other acute symptom change or concern otherwise discharged in stable and improved condition.        ADDITIONAL PROBLEM LIST    DISPOSITION AND DISCUSSIONS    I have discussed management of the patient with the following physicians and ROME's:      Discussion of management with other QHP or appropriate source(s):      Escalation of care considered, and ultimately not performed:acute inpatient care management, however at this time, the patient is most appropriate for outpatient management    Barriers to care at this time, including but not limited to: .     Decision tools and prescription drugs considered including, but not limited to: Heart score 2.  /57   Pulse 78   Temp 36.4 °C (97.6 °F) (Temporal)   Resp 16   Ht 1.575 m (5' 2\")   Wt 75 kg (165 lb 5.5 oz)   LMP 08/16/2023   SpO2 96%   BMI 30.24 kg/m²     Shashi Monroy M.D.  [address] [facility] [address]  [phone]          [facility], Emergency Dept  [address] [facility] [address] [phone]    in 12-24 hours if symptoms persist, immediately If symptoms worsen, or if you develop any other symptoms or concerns          FINAL DIAGNOSIS  1. Globus pharyngeus    2.  Chest pain, nonspecific       Electronically signed by: Ronak Erickson M.D.  "

## 2024-02-27 NOTE — ED TRIAGE NOTES
"Shelby Batsheva De Paz  53 y.o. female  Chief Complaint   Patient presents with    Neck Pain     Had endoscopy with dilation and biopsy done today around 1330. Patient called MD who suggested to go to ER for concerns of perforation.  Able to manage secretion and airway. Patient reports feels like a \"lump\" is left in the lower neck.      Pt ambulatory to triage with steady gait for above complaint.     Pt is GCS 15, speaking in full sentences, follows commands and responds appropriately to questions. Resp are even and unlabored.  Pt placed in ED lobby. Pt educated on triage process. Pt encouraged to alert staff for any changes.       Vitals:    02/26/24 2051   BP: 115/70   Pulse: 81   Resp: 16   Temp: 36.3 °C (97.4 °F)   SpO2: 94%     "

## 2024-03-11 ENCOUNTER — HOSPITAL ENCOUNTER (OUTPATIENT)
Dept: RADIOLOGY | Facility: MEDICAL CENTER | Age: 54
End: 2024-03-11
Attending: PHYSICIAN ASSISTANT
Payer: COMMERCIAL

## 2024-03-11 DIAGNOSIS — Z12.31 VISIT FOR SCREENING MAMMOGRAM: ICD-10-CM

## 2024-03-11 PROCEDURE — 77067 SCR MAMMO BI INCL CAD: CPT

## 2024-06-07 DIAGNOSIS — E03.9 HYPOTHYROIDISM (ACQUIRED): ICD-10-CM

## 2024-06-10 DIAGNOSIS — R53.83 FATIGUE, UNSPECIFIED TYPE: ICD-10-CM

## 2024-06-10 DIAGNOSIS — Z11.59 NEED FOR HEPATITIS C SCREENING TEST: ICD-10-CM

## 2024-06-10 DIAGNOSIS — E03.9 HYPOTHYROIDISM (ACQUIRED): ICD-10-CM

## 2024-06-10 DIAGNOSIS — E78.5 DYSLIPIDEMIA: ICD-10-CM

## 2024-06-10 DIAGNOSIS — E55.9 VITAMIN D DEFICIENCY: ICD-10-CM

## 2024-06-10 RX ORDER — LEVOTHYROXINE SODIUM 0.12 MG/1
125 TABLET ORAL
Qty: 90 TABLET | Refills: 0 | Status: SHIPPED
Start: 2024-06-10

## 2024-06-16 ENCOUNTER — HOSPITAL ENCOUNTER (OUTPATIENT)
Dept: RADIOLOGY | Facility: MEDICAL CENTER | Age: 54
End: 2024-06-16
Attending: FAMILY MEDICINE
Payer: COMMERCIAL

## 2024-06-16 ENCOUNTER — OFFICE VISIT (OUTPATIENT)
Dept: URGENT CARE | Facility: PHYSICIAN GROUP | Age: 54
End: 2024-06-16
Payer: COMMERCIAL

## 2024-06-16 VITALS
RESPIRATION RATE: 14 BRPM | BODY MASS INDEX: 32.11 KG/M2 | TEMPERATURE: 97.8 F | HEART RATE: 100 BPM | DIASTOLIC BLOOD PRESSURE: 56 MMHG | SYSTOLIC BLOOD PRESSURE: 100 MMHG | OXYGEN SATURATION: 94 % | HEIGHT: 62 IN | WEIGHT: 174.49 LBS

## 2024-06-16 DIAGNOSIS — S62.350A CLOSED NONDISPLACED FRACTURE OF SHAFT OF SECOND METACARPAL BONE OF RIGHT HAND, INITIAL ENCOUNTER: ICD-10-CM

## 2024-06-16 DIAGNOSIS — S20.212A CONTUSION OF LEFT CHEST WALL, INITIAL ENCOUNTER: ICD-10-CM

## 2024-06-16 DIAGNOSIS — S60.511A ABRASION OF RIGHT HAND, INITIAL ENCOUNTER: ICD-10-CM

## 2024-06-16 DIAGNOSIS — S63.92XA HAND SPRAIN, LEFT, INITIAL ENCOUNTER: ICD-10-CM

## 2024-06-16 PROCEDURE — 3078F DIAST BP <80 MM HG: CPT | Performed by: FAMILY MEDICINE

## 2024-06-16 PROCEDURE — 73130 X-RAY EXAM OF HAND: CPT | Mod: RT

## 2024-06-16 PROCEDURE — 3074F SYST BP LT 130 MM HG: CPT | Performed by: FAMILY MEDICINE

## 2024-06-16 PROCEDURE — 99214 OFFICE O/P EST MOD 30 MIN: CPT | Performed by: FAMILY MEDICINE

## 2024-06-16 RX ORDER — FAMOTIDINE 20 MG/1
60 TABLET, FILM COATED ORAL
COMMUNITY
Start: 2024-05-22

## 2024-06-16 ASSESSMENT — ENCOUNTER SYMPTOMS
TINGLING: 0
SENSORY CHANGE: 0
VOMITING: 0
FEVER: 0
SORE THROAT: 0
FOCAL WEAKNESS: 0
CHILLS: 0
SHORTNESS OF BREATH: 0
MYALGIAS: 0
NUMBNESS: 0
NAUSEA: 0

## 2024-06-16 ASSESSMENT — FIBROSIS 4 INDEX: FIB4 SCORE: 0.72

## 2024-06-16 NOTE — PROGRESS NOTES
Subjective:   Shelby De Paz is a 53 y.o. female who presents for Hand Injury (Right hand injury, fell and landed on it, swollen x 2 days. Does also have a bruise under left breast, also wants to get checked out.)        Hand Injury  Chronicity: Reports right hand pain swelling dorsal aspect, onset 2 days prior after falling onto a barbecue grill reports superficial abrasion right hand. Episode onset: 2 days prior. The problem occurs constantly. The problem has been unchanged. Pertinent negatives include no chills, fever, myalgias, nausea, numbness, rash, sore throat or vomiting. Associated symptoms comments: Tetanus immunization up-to-date    Reports left-sided breast and chest wall bruising after impact with barbecue grill, denies dyspnea, fever. Exacerbated by: Direct pressure, movement. Treatments tried: Wound cleaning. The treatment provided mild relief.     PMH:  has a past medical history of Asthma, Breath shortness, Bronchitis (09/18/2017), Cold (09/18/2017), Dental disorder, Disorder of thyroid, Finger fracture, GERD (gastroesophageal reflux disease), Heart burn, Hiatus hernia syndrome, Hyperlipidemia, Indigestion, Insomnia, Polycythemia, Right hand pain, Snoring, Stress incontinence, Tobacco abuse, Urinary incontinence, and Vitamin D deficiency.  MEDS:   Current Outpatient Medications:     famotidine (PEPCID) 20 MG Tab, 60 Tablets., Disp: , Rfl:     levothyroxine (SYNTHROID) 125 MCG Tab, Take 1 Tablet by mouth every morning on an empty stomach. LABS PRIOR TO ADDITIONAL REFILLS, Disp: 90 Tablet, Rfl: 0    Estradiol 0.025 MG/24HR PATCH BIWEEKLY, Apply 1 Patch topically every day., Disp: , Rfl:     medroxyPROGESTERone (PROVERA) 2.5 MG Tab, Take 2.5 mg by mouth every day., Disp: , Rfl:     omeprazole (PRILOSEC) 20 MG delayed-release capsule, Take 1 Capsule by mouth every day., Disp: 90 Capsule, Rfl: 1    ibuprofen (MOTRIN) 800 MG Tab, Take 800 mg by mouth every 8 hours as needed., Disp: , Rfl:      albuterol 108 (90 Base) MCG/ACT Aero Soln inhalation aerosol, Inhale 2 Puffs every 6 hours as needed for Shortness of Breath (cough). (Patient not taking: Reported on 6/16/2024), Disp: 8.5 g, Rfl: 0    norgestimate-ethinyl estradiol (ORTHO-CYCLEN) 0.25-35 MG-MCG per tablet, Take 1 Tablet by mouth every day. (Patient not taking: Reported on 1/13/2024), Disp: 84 Tablet, Rfl: 1  ALLERGIES:   Allergies   Allergen Reactions    Contrast Media With Iodine [Iodine] Itching and Swelling     Throat and mouth  Unv=[id number]    Other Misc Unspecified     cats     SURGHX:   Past Surgical History:   Procedure Laterality Date    NISSEN FUNDOPLICATION LAPAROSCOPIC  10/23/2017    Procedure: NISSEN FUNDOPLICATION LAPAROSCOPIC;  Surgeon: John H Ganser, M.D.;  Location: SURGERY [facility];  Service: General    BLADDER SLING FEMALE  08/19/2016    Procedure: BLADDER SLING FEMALE FOR : transvaginal tape ;  Surgeon: Travis Yepez M.D.;  Location: SURGERY [facility];  Service:     OTHER  2000    sinus surgey    DENTAL EXTRACTION(S)      HERNIA REPAIR      OTHER      T&A     SOCHX:  reports that she quit smoking about 9 years ago. Her smoking use included cigarettes. She started smoking about 29 years ago. She has a 10 pack-year smoking history. She has never used smokeless tobacco. She reports current alcohol use of about 3.6 oz of alcohol per week. She reports that she does not use drugs.  FH:   Family History   Problem Relation Age of Onset    Heart Attack Mother 57    Stroke Mother     Glaucoma Mother     Heart Attack Other 52        GF    Lung Disease Father     Heart Disease Maternal Grandfather     Drug abuse Sister     Alcohol abuse Sister      Review of Systems   Constitutional:  Negative for chills and fever.   HENT:  Negative for sore throat.    Respiratory:  Negative for shortness of breath.    Gastrointestinal:  Negative for nausea and vomiting.   Genitourinary:  Negative for dysuria.   Musculoskeletal:  Negative for  "myalgias.   Skin:  Negative for rash.   Neurological:  Negative for tingling, sensory change, focal weakness and numbness.        Objective:   /56 (BP Location: Left arm, Patient Position: Sitting, BP Cuff Size: Adult long)   Pulse 100   Temp 36.6 °C (97.8 °F) (Temporal)   Resp 14   Ht 1.575 m (5' 2\")   Wt 79.2 kg (174 lb 7.9 oz)   SpO2 94%   BMI 31.92 kg/m²   Physical Exam  Vitals and nursing note reviewed.   Constitutional:       General: She is not in acute distress.     Appearance: She is well-developed.   HENT:      Head: Normocephalic and atraumatic.      Right Ear: External ear normal.      Left Ear: External ear normal.      Nose: Nose normal.      Mouth/Throat:      Mouth: Mucous membranes are moist.   Eyes:      Conjunctiva/sclera: Conjunctivae normal.   Cardiovascular:      Rate and Rhythm: Normal rate.   Pulmonary:      Effort: Pulmonary effort is normal. No respiratory distress.      Breath sounds: Normal breath sounds. No wheezing or rhonchi.   Chest:       Abdominal:      General: There is no distension.   Musculoskeletal:         General: Normal range of motion.        Hands:    Skin:     General: Skin is warm and dry.   Neurological:      General: No focal deficit present.      Mental Status: She is alert and oriented to person, place, and time. Mental status is at baseline.      Gait: Gait (gait at baseline) normal.   Psychiatric:         Judgment: Judgment normal.     DX-HAND 3+ RIGHT  Order: [id number]   Status: Final result       Visible to patient: Yes (not seen)       Next appt: None       Dx: Hand sprain, left, initial encounter    0 Result Notes  Details    Reading Physician Reading Date Result Priority   Melchor Hendrix M.D.  [phone] 6/16/2024      Narrative & Impression     6/16/2024 3:48 PM     HISTORY/REASON FOR EXAM:  Pain/Deformity Following Trauma.        TECHNIQUE/EXAM DESCRIPTION AND NUMBER OF VIEWS:  3 views of the RIGHT hand.     COMPARISON: Right hand " radiographs 5/9/2023     FINDINGS:  Transversely oriented fracture through the mid second metacarpal diaphysis. No significant displacement. Minimal anterior angulation distal fracture.     No additional fracture detected.     Chronic appearing ossicle at the radial aspect of the third PIP, stable finding since prior.     IMPRESSION:     Second metacarpal fracture.           Exam Ended: 06/16/24  4:02 PM Last Resulted: 06/16/24  4:05 PM            Assessment/Plan:   1. Closed nondisplaced fracture of shaft of second metacarpal bone of right hand, initial encounter  - DX-HAND 3+ RIGHT; Future  - Referral to Orthopedics  - Wrist Brace    2. Abrasion of right hand, initial encounter    3. Contusion of left chest wall, initial encounter    Other orders  - famotidine (PEPCID) 20 MG Tab; 60 Tablets.        Medical Decision Making/Course:  In the course of preparing for this visit with review of the pertinent past medical history, recent and past clinic visits, current medications, and performing chart, immunization, medical history and medication reconciliation, and in the further course of obtaining the current history pertinent to the clinic visit today, performing an exam and evaluation, ordering and independently evaluating labs, tests including independent interpretation evaluation of x-ray imaging, and/or procedures including application of metacarpal splint during urgent care course, prescribing any recommended new medications as noted above, providing any pertinent counseling and education and recommending further coordination of care including initiation of consultation with orthopedic and including recommendations for symptomatic and supportive measures and fracture management and expectant management for chest wall contusion, at least  45 minutes of total time were spent during this encounter.      Discussed close monitoring, return precautions, and supportive measures of maintaining adequate fluid hydration  and caloric intake, relative rest and symptom management as needed for pain and/or fever.    Differential diagnosis, natural history, supportive care, and indications for immediate follow-up discussed.     Advised the patient to follow-up with the primary care physician for recheck, reevaluation, and consideration of further management.    Please note that this dictation was created using voice recognition software. I have worked with consultants from the vendor as well as technical experts from [employer] to optimize the interface. I have made every reasonable attempt to correct obvious errors, but I expect that there are errors of grammar and possibly content that I did not discover before finalizing the note.

## 2024-07-01 ENCOUNTER — HOSPITAL ENCOUNTER (OUTPATIENT)
Dept: LAB | Facility: MEDICAL CENTER | Age: 54
End: 2024-07-01
Attending: PHYSICIAN ASSISTANT
Payer: COMMERCIAL

## 2024-07-01 DIAGNOSIS — E03.9 HYPOTHYROIDISM (ACQUIRED): ICD-10-CM

## 2024-07-01 DIAGNOSIS — R53.83 FATIGUE, UNSPECIFIED TYPE: ICD-10-CM

## 2024-07-01 DIAGNOSIS — Z11.59 NEED FOR HEPATITIS C SCREENING TEST: ICD-10-CM

## 2024-07-01 DIAGNOSIS — E55.9 VITAMIN D DEFICIENCY: ICD-10-CM

## 2024-07-01 LAB
25(OH)D3 SERPL-MCNC: 21 NG/ML (ref 30–100)
BASOPHILS # BLD AUTO: 0.8 % (ref 0–1.8)
BASOPHILS # BLD: 0.06 K/UL (ref 0–0.12)
EOSINOPHIL # BLD AUTO: 0.25 K/UL (ref 0–0.51)
EOSINOPHIL NFR BLD: 3.5 % (ref 0–6.9)
ERYTHROCYTE [DISTWIDTH] IN BLOOD BY AUTOMATED COUNT: 44.7 FL (ref 35.9–50)
HCT VFR BLD AUTO: 43.3 % (ref 37–47)
HCV AB SER QL: NORMAL
HGB BLD-MCNC: 13.8 G/DL (ref 12–16)
IMM GRANULOCYTES # BLD AUTO: 0.01 K/UL (ref 0–0.11)
IMM GRANULOCYTES NFR BLD AUTO: 0.1 % (ref 0–0.9)
LYMPHOCYTES # BLD AUTO: 2.25 K/UL (ref 1–4.8)
LYMPHOCYTES NFR BLD: 31.5 % (ref 22–41)
MCH RBC QN AUTO: 30.6 PG (ref 27–33)
MCHC RBC AUTO-ENTMCNC: 31.9 G/DL (ref 32.2–35.5)
MCV RBC AUTO: 96 FL (ref 81.4–97.8)
MONOCYTES # BLD AUTO: 0.52 K/UL (ref 0–0.85)
MONOCYTES NFR BLD AUTO: 7.3 % (ref 0–13.4)
NEUTROPHILS # BLD AUTO: 4.05 K/UL (ref 1.82–7.42)
NEUTROPHILS NFR BLD: 56.8 % (ref 44–72)
NRBC # BLD AUTO: 0 K/UL
NRBC BLD-RTO: 0 /100 WBC (ref 0–0.2)
PLATELET # BLD AUTO: 368 K/UL (ref 164–446)
PMV BLD AUTO: 10.3 FL (ref 9–12.9)
RBC # BLD AUTO: 4.51 M/UL (ref 4.2–5.4)
TSH SERPL DL<=0.005 MIU/L-ACNC: 0.2 UIU/ML (ref 0.38–5.33)
WBC # BLD AUTO: 7.1 K/UL (ref 4.8–10.8)

## 2024-07-01 PROCEDURE — 85025 COMPLETE CBC W/AUTO DIFF WBC: CPT

## 2024-07-01 PROCEDURE — 86803 HEPATITIS C AB TEST: CPT

## 2024-07-01 PROCEDURE — 36415 COLL VENOUS BLD VENIPUNCTURE: CPT

## 2024-07-01 PROCEDURE — 84443 ASSAY THYROID STIM HORMONE: CPT

## 2024-07-01 PROCEDURE — 82306 VITAMIN D 25 HYDROXY: CPT

## 2024-07-08 DIAGNOSIS — E03.9 HYPOTHYROIDISM (ACQUIRED): ICD-10-CM

## 2024-07-08 RX ORDER — LEVOTHYROXINE SODIUM 0.12 MG/1
125 TABLET ORAL
Qty: 90 TABLET | Refills: 0 | Status: SHIPPED | OUTPATIENT
Start: 2024-07-08 | End: 2024-07-13

## 2024-07-08 RX ORDER — LEVOTHYROXINE SODIUM 0.12 MG/1
125 TABLET ORAL
Qty: 90 TABLET | Refills: 0 | OUTPATIENT
Start: 2024-07-08

## 2024-07-12 ENCOUNTER — HOSPITAL ENCOUNTER (OUTPATIENT)
Dept: LAB | Facility: MEDICAL CENTER | Age: 54
End: 2024-07-12
Attending: PHYSICIAN ASSISTANT
Payer: COMMERCIAL

## 2024-07-12 LAB
ALBUMIN SERPL BCP-MCNC: 4.2 G/DL (ref 3.2–4.9)
ALBUMIN/GLOB SERPL: 1.3 G/DL
ALP SERPL-CCNC: 84 U/L (ref 30–99)
ALT SERPL-CCNC: 19 U/L (ref 2–50)
ANION GAP SERPL CALC-SCNC: 12 MMOL/L (ref 7–16)
AST SERPL-CCNC: 11 U/L (ref 12–45)
BILIRUB SERPL-MCNC: 0.2 MG/DL (ref 0.1–1.5)
BUN SERPL-MCNC: 12 MG/DL (ref 8–22)
CALCIUM ALBUM COR SERPL-MCNC: 9.5 MG/DL (ref 8.5–10.5)
CALCIUM SERPL-MCNC: 9.7 MG/DL (ref 8.5–10.5)
CHLORIDE SERPL-SCNC: 103 MMOL/L (ref 96–112)
CHOLEST SERPL-MCNC: 254 MG/DL (ref 100–199)
CO2 SERPL-SCNC: 25 MMOL/L (ref 20–33)
CREAT SERPL-MCNC: 0.76 MG/DL (ref 0.5–1.4)
EST. AVERAGE GLUCOSE BLD GHB EST-MCNC: 105 MG/DL
GFR SERPLBLD CREATININE-BSD FMLA CKD-EPI: 94 ML/MIN/1.73 M 2
GLOBULIN SER CALC-MCNC: 3.2 G/DL (ref 1.9–3.5)
GLUCOSE SERPL-MCNC: 95 MG/DL (ref 65–99)
HBA1C MFR BLD: 5.3 % (ref 4–5.6)
HDLC SERPL-MCNC: 49 MG/DL
LDLC SERPL CALC-MCNC: 183 MG/DL
LIPASE SERPL-CCNC: 49 U/L (ref 11–82)
POTASSIUM SERPL-SCNC: 4.5 MMOL/L (ref 3.6–5.5)
PROT SERPL-MCNC: 7.4 G/DL (ref 6–8.2)
SODIUM SERPL-SCNC: 140 MMOL/L (ref 135–145)
T4 FREE SERPL-MCNC: 1.4 NG/DL (ref 0.93–1.7)
TRIGL SERPL-MCNC: 108 MG/DL (ref 0–149)
TSH SERPL DL<=0.005 MIU/L-ACNC: 0.11 UIU/ML (ref 0.38–5.33)
VIT B12 SERPL-MCNC: 264 PG/ML (ref 211–911)

## 2024-07-12 PROCEDURE — 36415 COLL VENOUS BLD VENIPUNCTURE: CPT

## 2024-07-12 PROCEDURE — 83036 HEMOGLOBIN GLYCOSYLATED A1C: CPT

## 2024-07-12 PROCEDURE — 82607 VITAMIN B-12: CPT

## 2024-07-12 PROCEDURE — 80053 COMPREHEN METABOLIC PANEL: CPT

## 2024-07-12 PROCEDURE — 80061 LIPID PANEL: CPT

## 2024-07-12 PROCEDURE — 83690 ASSAY OF LIPASE: CPT

## 2024-07-12 PROCEDURE — 84439 ASSAY OF FREE THYROXINE: CPT

## 2024-07-12 PROCEDURE — 84443 ASSAY THYROID STIM HORMONE: CPT

## 2024-07-13 DIAGNOSIS — E03.9 HYPOTHYROIDISM (ACQUIRED): ICD-10-CM

## 2024-07-13 RX ORDER — LEVOTHYROXINE SODIUM 112 UG/1
112 TABLET ORAL
Qty: 90 TABLET | Refills: 0 | Status: SHIPPED | OUTPATIENT
Start: 2024-07-13

## 2024-07-29 PROBLEM — S62.350A: Status: ACTIVE | Noted: 2024-07-29

## 2024-10-10 DIAGNOSIS — E03.9 HYPOTHYROIDISM (ACQUIRED): ICD-10-CM

## 2024-10-10 RX ORDER — LEVOTHYROXINE SODIUM 112 UG/1
112 TABLET ORAL
Qty: 90 TABLET | Refills: 0 | Status: SHIPPED | OUTPATIENT
Start: 2024-10-10

## 2025-03-06 DIAGNOSIS — E03.9 HYPOTHYROIDISM (ACQUIRED): ICD-10-CM

## 2025-03-07 NOTE — TELEPHONE ENCOUNTER
Received request via: Pharmacy    Was the patient seen in the last year in this department? No    Does the patient have an active prescription (recently filled or refills available) for medication(s) requested? No    Pharmacy Name:     [id number] - [place], [place] - [address] [place] [address] (Pharmacy) [phone]       Does the patient have intermediate Plus and need 100-day supply? (This applies to ALL medications) Patient does not have SCP

## 2025-03-11 RX ORDER — LEVOTHYROXINE SODIUM 112 UG/1
112 TABLET ORAL
Qty: 90 TABLET | Refills: 0 | Status: SHIPPED | OUTPATIENT
Start: 2025-03-11

## 2025-03-12 ENCOUNTER — HOSPITAL ENCOUNTER (OUTPATIENT)
Dept: RADIOLOGY | Facility: MEDICAL CENTER | Age: 55
End: 2025-03-12
Attending: PHYSICIAN ASSISTANT
Payer: COMMERCIAL

## 2025-03-12 DIAGNOSIS — Z12.31 VISIT FOR SCREENING MAMMOGRAM: ICD-10-CM

## 2025-03-12 PROCEDURE — 77067 SCR MAMMO BI INCL CAD: CPT

## 2025-03-17 ENCOUNTER — RESULTS FOLLOW-UP (OUTPATIENT)
Dept: MEDICAL GROUP | Facility: PHYSICIAN GROUP | Age: 55
End: 2025-03-17
Payer: COMMERCIAL

## 2025-04-03 ENCOUNTER — HOSPITAL ENCOUNTER (OUTPATIENT)
Dept: RADIOLOGY | Facility: MEDICAL CENTER | Age: 55
End: 2025-04-03
Attending: PHYSICIAN ASSISTANT
Payer: COMMERCIAL

## 2025-04-03 DIAGNOSIS — R92.8 ABNORMAL MAMMOGRAM: ICD-10-CM

## 2025-04-03 PROCEDURE — G0279 TOMOSYNTHESIS, MAMMO: HCPCS

## 2025-04-03 PROCEDURE — 76642 ULTRASOUND BREAST LIMITED: CPT | Mod: LT

## 2025-04-08 ENCOUNTER — HOSPITAL ENCOUNTER (OUTPATIENT)
Dept: RADIOLOGY | Facility: MEDICAL CENTER | Age: 55
End: 2025-04-08
Attending: PHYSICIAN ASSISTANT
Payer: COMMERCIAL

## 2025-04-08 ENCOUNTER — APPOINTMENT (OUTPATIENT)
Dept: MEDICAL GROUP | Facility: PHYSICIAN GROUP | Age: 55
End: 2025-04-08
Payer: COMMERCIAL

## 2025-04-08 VITALS
OXYGEN SATURATION: 99 % | DIASTOLIC BLOOD PRESSURE: 62 MMHG | BODY MASS INDEX: 29.33 KG/M2 | SYSTOLIC BLOOD PRESSURE: 98 MMHG | RESPIRATION RATE: 13 BRPM | WEIGHT: 159.4 LBS | TEMPERATURE: 98.2 F | HEART RATE: 72 BPM | HEIGHT: 62 IN

## 2025-04-08 DIAGNOSIS — N95.1 PERIMENOPAUSE: ICD-10-CM

## 2025-04-08 DIAGNOSIS — M25.552 CHRONIC HIP PAIN, LEFT: ICD-10-CM

## 2025-04-08 DIAGNOSIS — K44.9 HIATAL HERNIA: ICD-10-CM

## 2025-04-08 DIAGNOSIS — G89.29 CHRONIC HIP PAIN, LEFT: ICD-10-CM

## 2025-04-08 DIAGNOSIS — E78.5 DYSLIPIDEMIA: ICD-10-CM

## 2025-04-08 DIAGNOSIS — F51.01 PRIMARY INSOMNIA: ICD-10-CM

## 2025-04-08 DIAGNOSIS — E66.3 OVERWEIGHT: ICD-10-CM

## 2025-04-08 DIAGNOSIS — F41.9 ANXIETY: ICD-10-CM

## 2025-04-08 DIAGNOSIS — E55.9 VITAMIN D DEFICIENCY: ICD-10-CM

## 2025-04-08 DIAGNOSIS — K21.9 GERD WITHOUT ESOPHAGITIS: ICD-10-CM

## 2025-04-08 DIAGNOSIS — E03.9 HYPOTHYROIDISM (ACQUIRED): ICD-10-CM

## 2025-04-08 PROBLEM — S62.350A: Status: RESOLVED | Noted: 2024-07-29 | Resolved: 2025-04-08

## 2025-04-08 PROBLEM — R30.0 DYSURIA: Status: RESOLVED | Noted: 2023-02-22 | Resolved: 2025-04-08

## 2025-04-08 PROCEDURE — 73502 X-RAY EXAM HIP UNI 2-3 VIEWS: CPT | Mod: LT

## 2025-04-08 PROCEDURE — 3078F DIAST BP <80 MM HG: CPT | Performed by: PHYSICIAN ASSISTANT

## 2025-04-08 PROCEDURE — 99214 OFFICE O/P EST MOD 30 MIN: CPT | Performed by: PHYSICIAN ASSISTANT

## 2025-04-08 PROCEDURE — 3074F SYST BP LT 130 MM HG: CPT | Performed by: PHYSICIAN ASSISTANT

## 2025-04-08 RX ORDER — ESTRADIOL 0.05 MG/D
PATCH, EXTENDED RELEASE TRANSDERMAL
COMMUNITY
Start: 2025-04-01 | End: 2025-04-08

## 2025-04-08 ASSESSMENT — FIBROSIS 4 INDEX: FIB4 SCORE: 0.36

## 2025-04-08 ASSESSMENT — ENCOUNTER SYMPTOMS
CHILLS: 0
SHORTNESS OF BREATH: 0
FEVER: 0

## 2025-04-08 ASSESSMENT — PATIENT HEALTH QUESTIONNAIRE - PHQ9: CLINICAL INTERPRETATION OF PHQ2 SCORE: 0

## 2025-04-08 NOTE — ASSESSMENT & PLAN NOTE
Chronic, controlled.  Tolerating levothyroxine 150mcg daily.    Component      Latest Ref Rng 5/15/2023   TSH      0.380 - 5.330 uIU/mL 0.020 (L)       Decreasing levothyroxine to 137 mcg daily.    Component      Latest Ref Rng 12/14/2023   TSH      0.380 - 5.330 uIU/mL 0.030 (L)       Decreasing to 125mcg daily.      Component      Latest Ref Rng 7/1/2024   TSH      0.380 - 5.330 uIU/mL 0.195 (L)         Decreasing to 112mcg.

## 2025-04-08 NOTE — ASSESSMENT & PLAN NOTE
Chronic, ongoing.  Has follow up with [facility] some time in the next month  EGD/colonoscopy around Fall 2024    Interval history 5/2023:  Chronic, uncontrolled.  Resolved after Nissen fundoplication 2017 (Dr. Ganser)  Started back again around 6 months ago.  Daily for the last 1-2 months.  Taking a lot of Tums.  No other medications.  No specific triggers.  Constant, daily.  Worse with supine.  Will send back to GI.

## 2025-04-08 NOTE — ASSESSMENT & PLAN NOTE
Interval history 12/2023:  Chronic, controlled.  Tried vaginal hormones for vaginal dryness.   panicked thinking it would affect him.  It did help with the dryness.  Just started it around January 2023.  GYN following.    Irregular periods.  Will have it every few months and sometimes twice/month.  Usually light.    Hot flashes started around 2 months ago.  Having them every 2 hours 24 hours a day.  Waking up 2-5 times/night due to hot flashes or feeling anxious.

## 2025-04-08 NOTE — ASSESSMENT & PLAN NOTE
Chronic, uncontrolled.  Not taking supplementation.  Recommend starting OTC vitamin D.    Interval history 12/2023:  Chronic, uncontrolled.  Component      Latest Ref Rng 5/15/2023   25-Hydroxy Vitamin D 25      30 - 100 ng/mL 18 (L)       Recommend starting 9538-3075 IU daily.

## 2025-04-08 NOTE — ASSESSMENT & PLAN NOTE
Chronic, intermittent.  Like I have butterflies  I just feel anxious  Seems random  Sometimes at night  Sometimes during the day  Maybe a couple times a week  Lasting maybe 10 minutes  Seems to be able to talk herself out of it  Denies CP, SOB, nausea with the symptoms

## 2025-04-08 NOTE — ASSESSMENT & PLAN NOTE
Chronic, uncontrolled.  Started semaglutide first, swapped to tirzepatide around 6 weeks ago.  Through Nadeen Lovell PA-C.    4/2025: 159#  6/2024: 175#  12/2023: 171#

## 2025-04-08 NOTE — ASSESSMENT & PLAN NOTE
Chronic, uncontrolled.  Started around this time last year, 2024.  Was just in the morning.  Now throughout the day.  Now having pain when she goes for a walk.  Starting with xray and PT.  May need to consider PT.  Weight bearing alone doesn't worsen pain  Not worse with abduction  Worse with hip flexion

## 2025-04-08 NOTE — ASSESSMENT & PLAN NOTE
Chronic, uncontrolled.     Latest Labs:   Lab Results   Component Value Date/Time    CHOLSTRLTOT 254 (H) 07/12/2024 09:12 AM     (H) 07/12/2024 09:12 AM    HDL 49 07/12/2024 09:12 AM    TRIGLYCERIDE 108 07/12/2024 09:12 AM      Medications: none    Risk calculator: The 10-year ASCVD risk score (Ashly KEY, et al., 2019) is: 1.4%

## 2025-04-08 NOTE — PROGRESS NOTES
SUBJECTIVE:     CC: follow up chronic issues    HPI:   Shelby presents today with the following:    ASSESSMENT & PLAN by Problem:     Problem   Anxiety    Chronic, intermittent.  Continue without medication for now.  Patient will follow-up if her symptoms worsen.     Chronic Hip Pain, Left    Chronic, uncontrolled.  Started around this time last year, 2024.  Starting with xray and PT.  May need to consider PT.     Perimenopause    Chronic, ongoing.  Hormones managed by GYN.     Overweight    Chronic, uncontrolled.  Improving.  Using tirzepatide through Nadeen Lovell PA-C.    Discussed increasing plant-based foods, decreasing animal-based foods.  Increase daily activity, aiming for 30-45 minutes brisk exercise daily.       Hiatal Hernia    Nissen fundoplication 2017, Dr. Ganser.  Looks like it has returned.  ED visit fall 2024.  Increased omeprazole to 40mg daily.  May need to consider surgical referral.     Gerd Without Esophagitis    Chronic, ongoing.  Has follow up with [facility] some time in the next month  EGD/colonoscopy around Fall 2024     Primary Insomnia    Chronic, uncontrolled.  Due to hot flashes.  GYN started her on hormones and that has helped.     Dyslipidemia    Chronic, uncontrolled.  Not medicated.  Increase plant-based foods, decrease animal-based foods.    The 10-year ASCVD risk score (Ashly DK, et al., 2019) is: 1.4%     Vitamin D Deficiency    Chronic, uncontrolled.  Not taking supplementation.  Recommend starting OTC vitamin D.     Hypothyroidism (Acquired)    Chronic, uncontrolled.   TSH remains low.  Tolerating levothyroxine 112mcg daily.  Due to repeat TSH.     Nondisplaced Fracture of Shaft of Second Metacarpal Bone, Right Hand, Initial Encounter for Closed Fracture (Resolved)   Dysuria (Resolved)    Acute, uncontrolled.  Started a month ago.  Better with cranberry pills.  Comes and goes.  Occasional frequency (comes/goes).  Denies fever, N/V, flank pain.           TSH overdue.  Repeat labs  soon.  Will be due for all other labs early fall 2025.      Return in about 1 year (around 4/8/2026), or if symptoms worsen or fail to improve, for lab discussion.        HPI:     Problem List Items Addressed This Visit       Anxiety    Chronic, intermittent.  Like I have butterflies  I just feel anxious  Seems random  Sometimes at night  Sometimes during the day  Maybe a couple times a week  Lasting maybe 10 minutes  Seems to be able to talk herself out of it  Denies CP, SOB, nausea with the symptoms         Chronic hip pain, left    Chronic, uncontrolled.  Started around this time last year, 2024.  Was just in the morning.  Now throughout the day.  Now having pain when she goes for a walk.  Starting with xray and PT.  May need to consider PT.  Weight bearing alone doesn't worsen pain  Not worse with abduction  Worse with hip flexion         Relevant Orders    DX-HIP-COMPLETE - UNILATERAL 2+ LEFT    Referral to Physical Therapy    Dyslipidemia    Chronic, uncontrolled.     Latest Labs:   Lab Results   Component Value Date/Time    CHOLSTRLTOT 254 (H) 07/12/2024 09:12 AM     (H) 07/12/2024 09:12 AM    HDL 49 07/12/2024 09:12 AM    TRIGLYCERIDE 108 07/12/2024 09:12 AM      Medications: none    Risk calculator: The 10-year ASCVD risk score (Ashly DK, et al., 2019) is: 1.4%            Relevant Orders    Lipid Profile    GERD without esophagitis    Chronic, ongoing.  Has follow up with [facility] some time in the next month  EGD/colonoscopy around Fall 2024    Interval history 5/2023:  Chronic, uncontrolled.  Resolved after Nissen fundoplication 2017 (Dr. Ganser)  Started back again around 6 months ago.  Daily for the last 1-2 months.  Taking a lot of Tums.  No other medications.  No specific triggers.  Constant, daily.  Worse with supine.  Will send back to GI.         Hiatal hernia    Hypothyroidism (acquired)    Chronic, controlled.  Tolerating levothyroxine 150mcg daily.    Component      Latest Ref Rng 5/15/2023  "  TSH      0.380 - 5.330 uIU/mL 0.020 (L)       Decreasing levothyroxine to 137 mcg daily.    Component      Latest Ref Rng 12/14/2023   TSH      0.380 - 5.330 uIU/mL 0.030 (L)       Decreasing to 125mcg daily.      Component      Latest Ref Rng 7/1/2024   TSH      0.380 - 5.330 uIU/mL 0.195 (L)         Decreasing to 112mcg.         Relevant Orders    TSH    TSH    Overweight    Chronic, uncontrolled.  Started semaglutide first, swapped to tirzepatide around 6 weeks ago.  Through Nadeen Lovell PA-C.    4/2025: 159#  6/2024: 175#  12/2023: 171#         Relevant Orders    CBC WITH DIFFERENTIAL    Comp Metabolic Panel    Perimenopause      Interval history 12/2023:  Chronic, controlled.  Tried vaginal hormones for vaginal dryness.   panicked thinking it would affect him.  It did help with the dryness.  Just started it around January 2023.  GYN following.    Irregular periods.  Will have it every few months and sometimes twice/month.  Usually light.    Hot flashes started around 2 months ago.  Having them every 2 hours 24 hours a day.  Waking up 2-5 times/night due to hot flashes or feeling anxious.         Primary insomnia    Vitamin D deficiency    Chronic, uncontrolled.  Not taking supplementation.  Recommend starting OTC vitamin D.    Interval history 12/2023:  Chronic, uncontrolled.  Component      Latest Ref Rng 5/15/2023   25-Hydroxy Vitamin D 25      30 - 100 ng/mL 18 (L)       Recommend starting 5587-3827 IU daily.         Relevant Orders    VITAMIN D,25 HYDROXY (DEFICIENCY)              ROS:  Review of Systems   Constitutional:  Negative for chills and fever.   Respiratory:  Negative for shortness of breath.    Cardiovascular:  Negative for chest pain.       OBJECTIVE:     Exam:  BP 98/62   Pulse 72   Temp 36.8 °C (98.2 °F) (Temporal)   Resp 13   Ht 1.575 m (5' 2\")   Wt 72.3 kg (159 lb 6.4 oz)   SpO2 99%   BMI 29.15 kg/m²  Body mass index is 29.15 kg/m².    Physical Exam  Vitals reviewed. "   Constitutional:       General: She is not in acute distress.     Appearance: Normal appearance.   Cardiovascular:      Rate and Rhythm: Normal rate and regular rhythm.      Heart sounds: Normal heart sounds.   Pulmonary:      Effort: Pulmonary effort is normal.      Breath sounds: Normal breath sounds.   Musculoskeletal:      Cervical back: Normal range of motion and neck supple.   Skin:     General: Skin is warm.   Neurological:      General: No focal deficit present.      Mental Status: She is alert.   Psychiatric:         Mood and Affect: Mood normal.         Behavior: Behavior normal.         Judgment: Judgment normal.           Please note that this dictation was created using voice recognition software. I have made every reasonable attempt to correct obvious errors, but I expect that there are errors of grammar and possibly content that I did not discover before finalizing the note.

## 2025-04-11 ENCOUNTER — RESULTS FOLLOW-UP (OUTPATIENT)
Dept: MEDICAL GROUP | Facility: PHYSICIAN GROUP | Age: 55
End: 2025-04-11

## 2025-04-11 ENCOUNTER — HOSPITAL ENCOUNTER (OUTPATIENT)
Dept: LAB | Facility: MEDICAL CENTER | Age: 55
End: 2025-04-11
Attending: PHYSICIAN ASSISTANT
Payer: COMMERCIAL

## 2025-04-11 DIAGNOSIS — E03.9 HYPOTHYROIDISM (ACQUIRED): ICD-10-CM

## 2025-04-11 LAB — TSH SERPL-ACNC: 16.9 UIU/ML (ref 0.38–5.33)

## 2025-04-11 PROCEDURE — 84443 ASSAY THYROID STIM HORMONE: CPT

## 2025-04-11 PROCEDURE — 36415 COLL VENOUS BLD VENIPUNCTURE: CPT

## 2025-04-14 ENCOUNTER — RESULTS FOLLOW-UP (OUTPATIENT)
Dept: MEDICAL GROUP | Facility: PHYSICIAN GROUP | Age: 55
End: 2025-04-14

## 2025-04-14 DIAGNOSIS — E03.9 HYPOTHYROIDISM (ACQUIRED): ICD-10-CM

## 2025-04-14 RX ORDER — LEVOTHYROXINE SODIUM 137 UG/1
137 TABLET ORAL
Qty: 90 TABLET | Refills: 1 | Status: SHIPPED | OUTPATIENT
Start: 2025-04-14

## 2025-04-14 NOTE — Clinical Note
REFERRAL APPROVAL NOTICE         Sent on April 14, 2025                   Shelby Herman Zandra  [address] [place] [address]                   Dear Ms. De Paz,    After a careful review of the medical information and benefit coverage, [facility] has processed your referral. See below for additional details.    If applicable, you must be actively enrolled with your insurance for coverage of the authorized service. If you have any questions regarding your coverage, please contact your insurance directly.    REFERRAL INFORMATION   Referral #:  [id number]  Referred-To Department    Referred-By Provider:  Physical Therapy    Michelle Diaz P.A.-C.   [facility] [place]      [address]  [facility] [id number] [phone] [address]  [place] [address]  [phone]    Referral Start Date:  04/08/2025  Referral End Date:   04/08/2026             SCHEDULING  If you do not already have an appointment, please call [phone] to make an appointment.     MORE INFORMATION  If you do not already have a [employer] account, sign up at: [email or web address]  You can access your medical information, make appointments, see lab results, billing information, and more.  If you have questions regarding this referral, please contact  the [facility] Referrals department at:             [phone]. Monday - Friday 8:00AM - 5:00PM.     Sincerely,    [facility]

## 2025-06-26 ENCOUNTER — PHYSICAL THERAPY (OUTPATIENT)
Dept: PHYSICAL THERAPY | Facility: REHABILITATION | Age: 55
End: 2025-06-26
Attending: PHYSICIAN ASSISTANT
Payer: COMMERCIAL

## 2025-06-26 DIAGNOSIS — M25.552 CHRONIC HIP PAIN, LEFT: Primary | ICD-10-CM

## 2025-06-26 DIAGNOSIS — G89.29 CHRONIC HIP PAIN, LEFT: Primary | ICD-10-CM

## 2025-06-26 PROCEDURE — 97161 PT EVAL LOW COMPLEX 20 MIN: CPT

## 2025-06-26 PROCEDURE — 97110 THERAPEUTIC EXERCISES: CPT

## 2025-06-26 NOTE — OP THERAPY EVALUATION
Outpatient Physical Therapy  INITIAL EVALUATION    [facility] [place]  [address], [address]  [facility] [address]  Phone:  [phone]  Fax:  [phone]    Date of Evaluation: 06/26/2025    Patient: Shelby De Paz  YOB: 1970  MRN: [id number]     Referring Provider: Michelle Diaz P.A.-C.  [address] [place] [address]   Referring Diagnosis Chronic hip pain, left [M25.552, G89.29]     Time Calculation  Start time: 0908  Stop time: 0945 Time Calculation (min): 37 minutes         Chief Complaint: left hip pain  Visit Diagnoses     ICD-10-CM   1. Chronic hip pain, left  M25.552    G89.29       Subjective:   History of Present Illness:     Mechanism of injury:  Shelby De Paz is a 54 y.o. female that presents to therapy with left hip. Her symptoms came on with insidious onset. Her pain is located along the side of her left hip. Reports she has had some localized numbness along her left hip as well.  Patient denies fevers/chills, numbness/weakness of lower extremities, bowel/bladder incontinence, saddle anesthesia.     Aggravating factors: laying on left side, morning stiffness, hiking, sitting > 2 hours.   Relieving factors:movement, more frequent change in positions. IBU    ADL limitations:left hip pain with the above.       Past Medical History[1]  Past Surgical History[2]  Social History     Tobacco Use    Smoking status: Former     Current packs/day: 0.00     Average packs/day: 0.5 packs/day for 20.0 years (10.0 ttl pk-yrs)     Types: Cigarettes     Start date: 1/1/1995     Quit date: 1/1/2015     Years since quitting: 10.4    Smokeless tobacco: Never   Substance Use Topics    Alcohol use: Yes     Alcohol/week: 3.6 oz     Types: 2 Glasses of wine, 4 Cans of beer per week     Comment: 5-10 per month     Social Hx - Family and Occupational[3]    Objective     Lumbar Screen  Lumbar range of motion within normal limits.    Neurological Testing      Sensation     Hip   Left Hip   Intact: light touch    Right Hip   Intact: light touch    Strength:      Left Hip   Planes of Motion   Flexion: 4+  Extension: 4+  Abduction: 4-  Adduction: 5  External rotation: 5  Internal rotation: 4-    Right Hip   Planes of Motion   Flexion: 4+  Extension: 5  Abduction: 5  Adduction: 5  External rotation: 5  Internal rotation: 5    Tests     Left Hip   Positive scour.   Negative Maria T.         Therapeutic Exercises (CPT 34270):       Therapeutic Exercise Summary: HEP instruction/performance and development. Handout provided and exercises located below:  Access Code: [email or web address]  URL: [email or web address]  Date: 06/26/2025  Prepared by: Feliz Crespo    Exercises  - Supine Hip Internal and External Rotation  - 2-3 x daily - 30-40 reps  - Hip Flexor Stretch at Edge of Bed  - 1-2 x daily - 2 reps - 20-30 hold  - Beginner Bridge  - 1-2 x daily - 2 sets - 12 reps      Time-based treatments/modalities:    Physical Therapy Timed Treatment Charges  Therapeutic exercise minutes (CPT 44719): 10 minutes      Assessment, Response and Plan:   Assessment details:  Shelby De Paz is a 54 y.o. female with signs and symptoms consistent with femoral acetabular joint irritation and weakness. She requires skilled physical therapy intervention to decrease pain, increase range of motion, increase functional mobility, improve ADL completion and establish a home exercise program.  Goals:   Short Term Goals:   1. Patient will be Independent with prescribed Home Exercise Program (HEP) and will be able to demonstrate exercises without cues for improved overall symptoms/activity tolerance.   2. Pt will improve hip abduction strength to 4+/5 or greater for improved tolerance to load along lateral hip.  Short term goal time span:  2-4 weeks      Long Term Goals:    3. Pt will improve ability to sit for longer than 1.5 hours without pain or discomfort in hip upon standing >2/10.   4. Pt will  improve LEFS score to greater than 75/80 indicative of improved function and reduced perceived disability.  Long term goal time span:  4-6 weeks    Plan:   Planned therapy interventions:  Therapeutic Exercise (CPT 99916), Manual Therapy (CPT 79988), Neuromuscular Re-education (CPT 69280) and E Stim Unattended (CPT 36208)  Frequency: 1-2x/week.  Duration in weeks:  6  Discussed with:  Patient  Plan details:  Likely to progress to independence within this time frame.     Functional Assessment Used  PT Functional Assessment Tool Used: LEFS  PT Functional Assessment Score: 68/80     Referring provider co-signature:  I have reviewed this plan of care and my co-signature certifies the need for services.    Certification Period: 06/26/2025 to  08/07/25    Physician Signature: ________________________________ Date: ______________                      [1]   Past Medical History:  Diagnosis Date    Asthma     In childhood. Has not had it for 30 years.    Breath shortness     with cold 9/2017    Bronchitis 09/18/2017    Cold 09/18/2017    productive cough    Dental disorder     condition issues    Disorder of thyroid     Finger fracture     GERD (gastroesophageal reflux disease)     Heart burn     Hiatus hernia syndrome     Hyperlipidemia     Indigestion     Insomnia     Polycythemia     Right hand pain     Snoring     Stress incontinence     Tobacco abuse     Urinary incontinence     corrected with surgery    Vitamin D deficiency    [2]   Past Surgical History:  Procedure Laterality Date    NISSEN FUNDOPLICATION LAPAROSCOPIC  10/23/2017    Procedure: NISSEN FUNDOPLICATION LAPAROSCOPIC;  Surgeon: John H Ganser, M.D.;  Location: SURGERY [facility];  Service: General    BLADDER SLING FEMALE  08/19/2016    Procedure: BLADDER SLING FEMALE FOR : transvaginal tape ;  Surgeon: Travis Yepez M.D.;  Location: SURGERY [facility];  Service:     OTHER  2000    sinus surgey    DENTAL EXTRACTION(S)      HERNIA REPAIR       OTHER      T&A   [3]   Family and Occupational History  Socioeconomic History    Marital status:      Spouse name: Not on file    Number of children: Not on file    Years of education: Not on file    Highest education level: Some college, no degree   Occupational History    Not on file

## 2025-06-30 ENCOUNTER — APPOINTMENT (OUTPATIENT)
Dept: PHYSICAL THERAPY | Facility: REHABILITATION | Age: 55
End: 2025-06-30
Attending: PHYSICIAN ASSISTANT
Payer: COMMERCIAL

## 2025-07-03 ENCOUNTER — APPOINTMENT (OUTPATIENT)
Dept: PHYSICAL THERAPY | Facility: REHABILITATION | Age: 55
End: 2025-07-03
Attending: PHYSICIAN ASSISTANT
Payer: COMMERCIAL

## 2025-07-08 ENCOUNTER — PHYSICAL THERAPY (OUTPATIENT)
Dept: PHYSICAL THERAPY | Facility: REHABILITATION | Age: 55
End: 2025-07-08
Attending: PHYSICIAN ASSISTANT
Payer: COMMERCIAL

## 2025-07-08 DIAGNOSIS — G89.29 CHRONIC HIP PAIN, LEFT: Primary | ICD-10-CM

## 2025-07-08 DIAGNOSIS — M25.552 CHRONIC HIP PAIN, LEFT: Primary | ICD-10-CM

## 2025-07-08 PROCEDURE — 97110 THERAPEUTIC EXERCISES: CPT

## 2025-07-08 NOTE — OP THERAPY DAILY TREATMENT
Outpatient Physical Therapy  DAILY TREATMENT     [facility] [place]  [address], [address]  [facility] [address]  Phone:  [phone]  Fax:  [phone]    Date: 07/08/2025    Patient: Shelby De Paz  YOB: 1970  MRN: [id number]     Time Calculation    Start time: 0950  Stop time: 1030 Time Calculation (min): 40 minutes         Chief Complaint: left hip  Visit #: 2    SUBJECTIVE:  Reports compliance with HEP. No reports of worsening pain     OBJECTIVE:  Current objective measures: hip extension left 4+/5          Therapeutic Exercises (CPT 67503):     1. hip IR/Er, x 30    2. Single leg bridge, x 40    3. clamshell, pink x 12    4. reverse clams, x30    5. Supine lumbar twist, x 30    6. ball roll HS curl, x 30    7. supine pelvic tilt, x 2min    8. Supine march, lvl 1 x 10, lvl 2 x 10      Therapeutic Exercise Summary: HEP instruction/performance and development. Handout provided and exercises located below:  Access Code: [email or web address]  URL: [email or web address]  Date: 07/08/2025  Prepared by: Feliz Crespo    Exercises  - Supine Hip Internal and External Rotation  - 2-3 x daily - 30-40 reps  - Hip Flexor Stretch at Edge of Bed  - 1-2 x daily - 2 reps - 20-30 hold  - Single Leg Bridge  - 1 x daily - 2 sets - 8-10 reps  - Clamshell with Resistance  - 1 x daily - 2 sets - 8-12 reps      Time-based treatments/modalities:    Physical Therapy Timed Treatment Charges  Therapeutic exercise minutes (CPT 65026): 40 minutes      Pain rating (1-10) before treatment:  1  Pain rating (1-10) after treatment:  1    ASSESSMENT:   Response to treatment: Symptoms controlled during session. HEP progressed to address strength deficits. ROM appeared improved and less irritating for hip. F/u in 2 days.     PLAN/RECOMMENDATIONS:   Plan for treatment: therapy treatment to continue next visit.  Planned interventions for next visit: continue with current treatment.

## 2025-07-10 ENCOUNTER — PHYSICAL THERAPY (OUTPATIENT)
Dept: PHYSICAL THERAPY | Facility: REHABILITATION | Age: 55
End: 2025-07-10
Attending: PHYSICIAN ASSISTANT
Payer: COMMERCIAL

## 2025-07-10 DIAGNOSIS — G89.29 CHRONIC HIP PAIN, LEFT: Primary | ICD-10-CM

## 2025-07-10 DIAGNOSIS — M25.552 CHRONIC HIP PAIN, LEFT: Primary | ICD-10-CM

## 2025-07-10 PROCEDURE — 97110 THERAPEUTIC EXERCISES: CPT

## 2025-07-10 NOTE — OP THERAPY DAILY TREATMENT
Outpatient Physical Therapy  DAILY TREATMENT     [facility] [place]  [address]  [facility] [address]  Phone:  [phone]  Fax:  [phone]    Date: 07/10/2025    Patient: Shelby De Paz  YOB: 1970  MRN: [id number]     Time Calculation    Start time: 0945  Stop time: 1030 Time Calculation (min): 45 minutes         Chief Complaint: left hip  Visit #: 3    SUBJECTIVE:  Soreness along lateral hip reported s/p introduction to clamshell exercise. HEP compliance reported. Otherwise notes symptoms not worsened with treatment.      OBJECTIVE:  Current objective measures: hip extension left 4+/5          Therapeutic Exercises (CPT 08616):     1. hip IR/Er, x 30    2. bolster bridge, x 30    3. Supine clamshell, purple, x 40    4. reverse clams, x20    5. Supine lumbar twist, x 30, NT    6. ball roll HS curl, x 30    7. supine pelvic tilt, x 2min    8. Supine march, lvl 1 x 10, lvl 2 x 10    9. leg swings, x 20 ea 2 way    10. hip stool rotations, 1min    11. Hip fencing stretch, 5-15 sec x 3 ea    12. Shuttle, Dl 4c x 1min, 5c x 1min, SL 4c x 1min ea      Therapeutic Exercise Summary: HEP instruction/performance and development. Handout provided and exercises located below:  Access Code: [email or web address]  URL: [email or web address]  Date: 07/08/2025  Prepared by: Feliz Crespo    Exercises  - Supine Hip Internal and External Rotation  - 2-3 x daily - 30-40 reps  - Hip Flexor Stretch at Edge of Bed  - 1-2 x daily - 2 reps - 20-30 hold  - Single Leg Bridge  - 1 x daily - 2 sets - 8-10 reps  - Clamshell with Resistance  - 1 x daily - 2 sets - 8-12 reps      Time-based treatments/modalities:    Physical Therapy Timed Treatment Charges  Therapeutic exercise minutes (CPT 13228): 45 minutes      Pain rating (1-10) before treatment:  1  Pain rating (1-10) after treatment:  1    ASSESSMENT:   Response to treatment: Symptoms controlled during session. HEP to be maintained.  Overall ROM appears  improved and less irritating for hip. F/u next week.      PLAN/RECOMMENDATIONS:   Plan for treatment: therapy treatment to continue next visit.  Planned interventions for next visit: continue with current treatment.

## 2025-07-11 ENCOUNTER — HOSPITAL ENCOUNTER (OUTPATIENT)
Dept: RADIOLOGY | Facility: MEDICAL CENTER | Age: 55
End: 2025-07-11
Attending: PHYSICIAN ASSISTANT
Payer: COMMERCIAL

## 2025-07-11 DIAGNOSIS — R19.8 LUQ FULLNESS: ICD-10-CM

## 2025-07-11 PROCEDURE — 76705 ECHO EXAM OF ABDOMEN: CPT

## 2025-07-15 ENCOUNTER — APPOINTMENT (OUTPATIENT)
Dept: PHYSICAL THERAPY | Facility: REHABILITATION | Age: 55
End: 2025-07-15
Attending: PHYSICIAN ASSISTANT
Payer: COMMERCIAL

## 2025-07-22 ENCOUNTER — PHYSICAL THERAPY (OUTPATIENT)
Dept: PHYSICAL THERAPY | Facility: REHABILITATION | Age: 55
End: 2025-07-22
Attending: PHYSICIAN ASSISTANT
Payer: COMMERCIAL

## 2025-07-22 DIAGNOSIS — M25.552 CHRONIC HIP PAIN, LEFT: Primary | ICD-10-CM

## 2025-07-22 DIAGNOSIS — G89.29 CHRONIC HIP PAIN, LEFT: Primary | ICD-10-CM

## 2025-07-22 PROCEDURE — 97110 THERAPEUTIC EXERCISES: CPT

## 2025-07-22 NOTE — OP THERAPY DAILY TREATMENT
Outpatient Physical Therapy  DAILY TREATMENT     [facility] [address], [address]  [facility] [address]  Phone:  [phone]  Fax:  [phone]    Date: 07/22/2025    Patient: Shelby De Paz  YOB: 1970  MRN: [id number]     Time Calculation    Start time: 0945  Stop time: 1030 Time Calculation (min): 45 minutes       Chief Complaint: left hip  Visit #: 4    SUBJECTIVE:  Notes only pain discomfort with clamshell exercises. Overall reporters general improvement.     OBJECTIVE:  Current objective measures: hip extension/abduction left 4+/5          Therapeutic Exercises (CPT 27525):     1. bike, lvl 6 x 6min, seat postion 4-5    2. leg swings, on step holding onto bike, 20 lateral, 20 frontal ea    3. hip hike, x 7 ea, to progress without pain    4. xband walks, orange 2 LAPS    5. Shuttle, Dl 4c x 1min, 5c x 1min    6. ball roll HS curl, x 30    7. supine pelvic tilt, x 2min    8. Supine march, lvl 1 x 10, lvl 2 x 10    9. Supine lumbar twist, x 30    10. hip IR/Er, x 30    11. Hip fencing stretch, 5-15 sec x 3 ea, NT    13. Supine clamshell, decreased to orange band for HEP, NT      Therapeutic Exercise Summary: HEP instruction/performance and development. Handout provided and exercises located below:  Access Code: [email or web address]  URL: [email or web address]  Date: 07/22/2025  Prepared by: Feliz Crespo    Exercises  - Supine Hip Internal and External Rotation  - 2-3 x daily - 30-40 reps  - Hip Flexor Stretch at Edge of Bed  - 1-2 x daily - 2 reps - 20-30 hold  - Single Leg Bridge  - 1 x daily - 2 sets - 8-10 reps  - Clamshell with Resistance  - 1 x daily - 2 sets - 8-12 reps  - X Band Walk  - 1 x daily - 2 sets - 10-20 reps      Time-based treatments/modalities:    Physical Therapy Timed Treatment Charges  Therapeutic exercise minutes (CPT 14798): 45 minutes      Pain rating (1-10) before treatment:  1  Pain rating (1-10) after treatment:  1    ASSESSMENT:    Response to treatment: Symptoms controlled during session. HEP progressed and compliance encouraged.  Overall ROM appears improved and less irritating for hip. F/u later this week.       PLAN/RECOMMENDATIONS:   Plan for treatment: therapy treatment to continue next visit.  Planned interventions for next visit: continue with current treatment.

## 2025-07-24 ENCOUNTER — PHYSICAL THERAPY (OUTPATIENT)
Dept: PHYSICAL THERAPY | Facility: REHABILITATION | Age: 55
End: 2025-07-24
Attending: PHYSICIAN ASSISTANT
Payer: COMMERCIAL

## 2025-07-24 DIAGNOSIS — G89.29 CHRONIC HIP PAIN, LEFT: Primary | ICD-10-CM

## 2025-07-24 DIAGNOSIS — M25.552 CHRONIC HIP PAIN, LEFT: Primary | ICD-10-CM

## 2025-07-24 PROCEDURE — 97110 THERAPEUTIC EXERCISES: CPT

## 2025-07-24 NOTE — OP THERAPY PROGRESS SUMMARY
Outpatient Physical Therapy  PROGRESS SUMMARY NOTE      [facility] [place]  [address], [address]  [facility] [address]  Phone:  [phone]  Fax:  [phone]    Date of Visit: 07/24/2025    Patient: Shelby De Paz  YOB: 1970  MRN: [id number]     Referring Provider: Michelle Diaz P.A.-C.  [address] [place] [address]   Referring Diagnosis Pain in left hip [M25.552];Other chronic pain [G89.29]     Visit Diagnoses     ICD-10-CM   1. Chronic hip pain, left  M25.552    G89.29       Rehab Potential: good    Progress Report Period: 6/25 - 7/24/25    Functional Assessment Used  PT Functional Assessment Tool Used: LEFS  PT Functional Assessment Score: 75/80       Objective Findings and Assessment:   Patient progression towards goals: Pt reports she limits sitting ~1 hour.   L hip soreness after banded clams.  Performs HEP daily.  Ty huang feels good, relieves pain.  Woke last night with deep L hip joint pain and numb on lateral hip.  Still sore this morning.  Numbness has resolved.  States she tries to sleep on her R side, but often wakes up in L sidelying.     Objective findings and assessment details: Left Hip   Planes of Motion   Flexion: 5-  Extension: 4+  Abduction: 4+ with pain  Adduction: 5  External rotation: 5  Internal rotation: 4+ with pain     Right Hip   Planes of Motion   Flexion: 4+  Extension: 5  Abduction: 5  Adduction: 5  External rotation: 5  Internal rotation: 5         Goals:   Short Term Goals:   1. Patient will be Independent with prescribed Home Exercise Program (HEP) and will be able to demonstrate exercises without cues for improved overall symptoms/activity tolerance. - MET , CONTINUE  2. Pt will improve hip abduction strength to 4+/5 or greater for improved tolerance to load along lateral hip.- MET, DC  NEW STG:  Pt will improve hip abduction strength to 5/5 for improved tolerance to load along lateral hip.    Short term  goal time span:  2-4 weeks      Long Term Goals:    3. Pt will improve ability to sit for longer than 1.5 hours without pain or discomfort in hip upon standing >2/10. - NOT MET, CONTINUE.  4. Pt will improve LEFS score to greater than 75/80 indicative of improved function and reduced perceived disability.- NOT MET, CONTINUE.    Long term goal time span:  4-6 weeks    Plan:   Planned therapy interventions:  Therapeutic Exercise (CPT 04806), Therapeutic Activities (CPT 79428), Gait Training (CPT 15894), Manual Therapy (CPT 73134) and Neuromuscular Re-education (CPT 49470)  Frequency:  2x week  Duration in visits:  6      Referring provider co-signature:  I have reviewed this plan of care and my co-signature certifies the need for services.     Certification Period: 07/24/2025 to 09/27/25    Physician Signature: ________________________________ Date: ______________

## 2025-07-24 NOTE — OP THERAPY DAILY TREATMENT
Outpatient Physical Therapy  DAILY TREATMENT     [facility] [place]  [address], [address]  [place] [facility] [address]  Phone:  [phone]  Fax:  [phone]    Date: 07/24/2025    Patient: Shelby De Paz  YOB: 1970  MRN: [id number]     Time Calculation      9:48  10:30  42 minutes         Chief Complaint: Hip Problem    Visit #: 5    SUBJECTIVE:  Woke last night with deep L hip joint pain and numb on lateral hip.  Still sore this morning.  Numbness has resolved.  States she tries to sleep on her R side, but often wakes up in L sidelying.  Avoids sitting more than 1 hour without getting up to stand/ walk for change in position.    OBJECTIVE:  Current objective measures: See progress summary.  PT Functional Assessment Tool Used: LEFS  PT Functional Assessment Score: 75/80     Therapeutic Exercises (CPT 01140):     1. bike, lvl 6 x 6min, seat position 4    2. leg swings, on step holding onto bike, 20 lateral, 20 frontal ea    3. hip hike, x15 ea, pt reports pain in stance hip.    4. lateral steps, 2 laps without band, L hip pain with weight shift toward R.    5. Shuttle, Dl 4c x 2 min    6. ball roll HS curl, x 30    7. supine pelvic tilt, x 2min    8. AP weight shift in stride stance on Airex, x15B, min intermittent UE support for balance, L hip achy with forward weight shift onto LLE    9. Supine lumbar twist with LEs on ball, x 20    10. standing hip IR/ER on slider, X15B    11. standing hip circles on slider, x10B CW/CCW      Therapeutic Exercise Summary: HEP instruction/performance and development. Handout provided and exercises located below:  Access Code: [email or web address]  URL: [email or web address]  Date: 07/22/2025  Prepared by: Feliz Crespo    Exercises  - Supine Hip Internal and External Rotation  - 2-3 x daily - 30-40 reps  - Hip Flexor Stretch at Edge of Bed  - 1-2 x daily - 2 reps - 20-30 hold  - Single Leg Bridge  - 1 x daily - 2 sets - 8-10 reps  - Clamshell with  Resistance  - 1 x daily - 2 sets - 8-12 reps  - X Band Walk  - 1 x daily - 2 sets - 10-20 reps      Time-based treatments/modalities:  Therapeutic exercise minutes (CPT 20899) 42 minutes              ASSESSMENT:   Response to treatment: Pt demo improvements in strength and symptoms.  Will benefit from continued PT to address remaining strength impairments and progress HEP for independence with symptom management.    PLAN/RECOMMENDATIONS:   Plan for treatment: therapy treatment to continue next visit.  Planned interventions for next visit: continue with current treatment.

## 2025-07-29 ENCOUNTER — PHYSICAL THERAPY (OUTPATIENT)
Dept: PHYSICAL THERAPY | Facility: REHABILITATION | Age: 55
End: 2025-07-29
Attending: PHYSICIAN ASSISTANT
Payer: COMMERCIAL

## 2025-07-29 DIAGNOSIS — G89.29 CHRONIC HIP PAIN, LEFT: Primary | ICD-10-CM

## 2025-07-29 DIAGNOSIS — M25.552 CHRONIC HIP PAIN, LEFT: Primary | ICD-10-CM

## 2025-07-29 PROCEDURE — 97110 THERAPEUTIC EXERCISES: CPT

## 2025-07-29 NOTE — OP THERAPY DAILY TREATMENT
Outpatient Physical Therapy  DAILY TREATMENT     [facility] [address], [address]  [facility] [address]  Phone:  [phone]  Fax:  [phone]    Date: 07/29/2025    Patient: Shelby De Paz  YOB: 1970  MRN: [id number]     Time Calculation    Start time: 0950 9:50 Stop time: 165056:30 Time Calculation (min): 40 [id number] minutes         Chief Complaint: left hip  Visit #: 6    SUBJECTIVE:  No worsening of symptoms. Notes the same bout of pain/ numbness along L hip/ thigh.    OBJECTIVE:  Current objective measures: hip abduction weakness        Therapeutic Exercises (CPT 18402):     1. bike, NT    2. leg swings, on step holding onto bike, 20 lateral, 20 frontal ea    3. lateral hip/ leg slides, x 30, orange band  30    4. lateral steps, 2 laps without band, L hip pain with weight shift toward R.    5. Shuttle, Dl 4c x 1min, SL 3c, DL 6c with pink abd band    6. ball roll HS curl, x 30    7. supine pelvic tilt, 1min    8. Supine march, lvl 2  x1min    9. Supine lumbar twist with LEs on ball, x 20    10. standing hip IR/ER on slider    11. standing hip circles on slider      Therapeutic Exercise Summary: HEP instruction/performance and development. Handout provided and exercises located below:  Access Code: [email or web address]  URL: [email or web address]  Date: 07/22/2025  Prepared by: Feliz Crespo    Exercises  - Supine Hip Internal and External Rotation  - 2-3 x daily - 30-40 reps  - Hip Flexor Stretch at Edge of Bed  - 1-2 x daily - 2 reps - 20-30 hold  - Single Leg Bridge  - 1 x daily - 2 sets - 8-10 reps  - Clamshell with Resistance  - 1 x daily - 2 sets - 8-12 reps  - X Band Walk  - 1 x daily - 2 sets - 10-20 reps      Time-based treatments/modalities:  Therapeutic exercise minutes (CPT 15836) 40 minutes     Physical Therapy Timed Treatment Charges  Therapeutic exercise minutes (CPT 56528): 40 minutes    ASSESSMENT:   Response to treatment: strength improvement  and ROm improvement maintained. Will benefit from continued PT to address remaining strength impairments and progress HEP for independence with symptom management. Fu in 2 days.    PLAN/RECOMMENDATIONS:   Plan for treatment: therapy treatment to continue next visit.  Planned interventions for next visit: continue with current treatment.

## 2025-07-31 ENCOUNTER — PHYSICAL THERAPY (OUTPATIENT)
Dept: PHYSICAL THERAPY | Facility: REHABILITATION | Age: 55
End: 2025-07-31
Attending: PHYSICIAN ASSISTANT
Payer: COMMERCIAL

## 2025-07-31 DIAGNOSIS — G89.29 CHRONIC HIP PAIN, LEFT: Primary | ICD-10-CM

## 2025-07-31 DIAGNOSIS — M25.552 CHRONIC HIP PAIN, LEFT: Primary | ICD-10-CM

## 2025-07-31 PROCEDURE — 97110 THERAPEUTIC EXERCISES: CPT

## 2025-07-31 NOTE — OP THERAPY DAILY TREATMENT
Outpatient Physical Therapy  DAILY TREATMENT     [facility] [place]  [address]  [facility] [address]  Phone:  [phone]  Fax:  [phone]    Date: 07/31/2025    Patient: Shelby De Paz  YOB: 1970  MRN: [id number]     Time Calculation    Start time: 0945  Stop time: 1030 Time Calculation (min): 45 minutes         Chief Complaint: Left hip  Visit #: 7    SUBJECTIVE:  Notes no particular worsening of symptoms s/p last session. Has been doing standing band walk instead of hip abduction in sidelying.     OBJECTIVE:  Current objective measures: hip abduction weakness          Therapeutic Exercises (CPT 91778):     1. bike, lvl 7 x 10min    2. leg swings, on step holding onto bike, 20 lateral, 20 frontal ea    3. Step down, 8in x 10 ea    4. x band walk, pink x 2 laps    5. Shuttle, NT    6. ball roll HS curl, x 30    7. supine pelvic tilt, 1min    8. Supine march, lvl 2  x1min    9. Supine lumbar twist with LEs on ball, x 20    10. hip cross body stretch with strap, 15sec x 3 ea    11. open book, R sidelying only      Therapeutic Exercise Summary: HEP instruction/performance and development. Handout provided and exercises located below:  Access Code: [email or web address]  URL: [email or web address]  Date: 07/31/2025  Prepared by: Feliz Crespo    Exercises  - Supine Hip Internal and External Rotation  - 2-3 x daily - 30-40 reps  - Hip Flexor Stretch at Edge of Bed  - 1-2 x daily - 2 reps - 20-30 hold  - Single Leg Bridge  - 1 x daily - 2 sets - 8-10 reps  - Clamshell with Resistance  - 1 x daily - 2 sets - 8-12 reps  - X Band Walk  - 1 x daily - 2 sets - 10-20 reps  - Supine March with Alternating Leg Lifts  - 1 x daily - 3 sets - 10 reps  - bridge with ball under knees  - 1 x daily - 3 sets - 10 reps      Time-based treatments/modalities:    Physical Therapy Timed Treatment Charges  Therapeutic exercise minutes (CPT 29381): 45 minutes      Pain rating (1-10) before  treatment:  1  Pain rating (1-10) after treatment:  1    ASSESSMENT:   Response to treatment: Symptoms controlled and pt to trial independence for 30 days. F/u within 30 days if symptoms worsen. D/C in 30 days.    PLAN/RECOMMENDATIONS:   Plan for treatment: therapy treatment to continue next visit.  Planned interventions for next visit: continue with current treatment.

## 2025-07-31 NOTE — OP THERAPY DAILY TREATMENT
Outpatient Physical Therapy  DAILY TREATMENT     [facility] [address], [address]  [facility] [address]  Phone:  [phone]  Fax:  [phone]    Date: 07/31/2025    Patient: Shelby De Paz  YOB: 1970  MRN: [id number]     Time Calculation      9:50  10:30  40 minutes         Chief Complaint: left hip  Visit #: 7    SUBJECTIVE:  No worsening of symptoms. Notes the same bout of pain/ numbness along L hip/ thigh.    OBJECTIVE:  Current objective measures: hip abduction weakness        Therapeutic Exercises (CPT 03949):     1. bike, seat postion 3, lvl 7.5 x 6min    2. leg swings, on step holding onto bike, 20 lateral, 20 frontal ea    3. lateral hip/ leg slides, x 30, orange band  30    4. lateral steps, 2 laps without band, L hip pain with weight shift toward R.    5. Shuttle, Dl 4c x 1min, SL 3c, DL 6c with pink abd band    6. ball roll HS curl, x 30    7. supine pelvic tilt, 1min    8. Supine march, lvl 2  x1min    9. Supine lumbar twist with LEs on ball, x 20    10. standing hip IR/ER on slider    11. standing hip circles on slider      Therapeutic Exercise Summary: HEP instruction/performance and development. Handout provided and exercises located below:  Access Code: [email or web address]  URL: [email or web address]  Date: 07/22/2025  Prepared by: Feliz Crespo    Exercises  - Supine Hip Internal and External Rotation  - 2-3 x daily - 30-40 reps  - Hip Flexor Stretch at Edge of Bed  - 1-2 x daily - 2 reps - 20-30 hold  - Single Leg Bridge  - 1 x daily - 2 sets - 8-10 reps  - Clamshell with Resistance  - 1 x daily - 2 sets - 8-12 reps  - X Band Walk  - 1 x daily - 2 sets - 10-20 reps      Time-based treatments/modalities:  Therapeutic exercise minutes (CPT 20639) 40 minutes          ASSESSMENT:   Response to treatment: strength improvement and ROm improvement maintained. Will benefit from continued PT to address remaining strength impairments and progress HEP for  independence with symptom management. Fu in 2 days.    PLAN/RECOMMENDATIONS:   Plan for treatment: therapy treatment to continue next visit.  Planned interventions for next visit: continue with current treatment.

## 2025-08-19 ENCOUNTER — OFFICE VISIT (OUTPATIENT)
Dept: URGENT CARE | Facility: PHYSICIAN GROUP | Age: 55
End: 2025-08-19
Payer: COMMERCIAL

## 2025-08-19 ENCOUNTER — HOSPITAL ENCOUNTER (OUTPATIENT)
Facility: MEDICAL CENTER | Age: 55
End: 2025-08-19
Payer: COMMERCIAL

## 2025-08-19 VITALS
DIASTOLIC BLOOD PRESSURE: 58 MMHG | WEIGHT: 146.83 LBS | RESPIRATION RATE: 18 BRPM | TEMPERATURE: 96.9 F | HEART RATE: 85 BPM | SYSTOLIC BLOOD PRESSURE: 104 MMHG | OXYGEN SATURATION: 96 % | HEIGHT: 62 IN | BODY MASS INDEX: 27.02 KG/M2

## 2025-08-19 DIAGNOSIS — R30.0 DYSURIA: Primary | ICD-10-CM

## 2025-08-19 DIAGNOSIS — N39.0 URINARY TRACT INFECTION WITHOUT HEMATURIA, SITE UNSPECIFIED: ICD-10-CM

## 2025-08-19 DIAGNOSIS — N89.8 VAGINAL IRRITATION: ICD-10-CM

## 2025-08-19 DIAGNOSIS — R82.998 LEUKOCYTES IN URINE: ICD-10-CM

## 2025-08-19 LAB
APPEARANCE UR: CLEAR
BILIRUB UR STRIP-MCNC: NEGATIVE MG/DL
COLOR UR AUTO: YELLOW
GLUCOSE UR STRIP.AUTO-MCNC: NEGATIVE MG/DL
KETONES UR STRIP.AUTO-MCNC: NEGATIVE MG/DL
LEUKOCYTE ESTERASE UR QL STRIP.AUTO: NORMAL
NITRITE UR QL STRIP.AUTO: NEGATIVE
PH UR STRIP.AUTO: 6 [PH] (ref 5–8)
PROT UR QL STRIP: NEGATIVE MG/DL
RBC UR QL AUTO: NEGATIVE
SP GR UR STRIP.AUTO: 1.01
UROBILINOGEN UR STRIP-MCNC: NORMAL MG/DL

## 2025-08-19 PROCEDURE — 87510 GARDNER VAG DNA DIR PROBE: CPT

## 2025-08-19 PROCEDURE — 87480 CANDIDA DNA DIR PROBE: CPT

## 2025-08-19 PROCEDURE — 3074F SYST BP LT 130 MM HG: CPT

## 2025-08-19 PROCEDURE — 87186 SC STD MICRODIL/AGAR DIL: CPT

## 2025-08-19 PROCEDURE — 87086 URINE CULTURE/COLONY COUNT: CPT

## 2025-08-19 PROCEDURE — 87077 CULTURE AEROBIC IDENTIFY: CPT

## 2025-08-19 PROCEDURE — 3078F DIAST BP <80 MM HG: CPT

## 2025-08-19 PROCEDURE — 99214 OFFICE O/P EST MOD 30 MIN: CPT

## 2025-08-19 PROCEDURE — 81002 URINALYSIS NONAUTO W/O SCOPE: CPT

## 2025-08-19 PROCEDURE — 87660 TRICHOMONAS VAGIN DIR PROBE: CPT

## 2025-08-19 RX ORDER — NITROFURANTOIN 25; 75 MG/1; MG/1
100 CAPSULE ORAL 2 TIMES DAILY
Qty: 10 CAPSULE | Refills: 0 | Status: SHIPPED | OUTPATIENT
Start: 2025-08-19 | End: 2025-08-24

## 2025-08-19 RX ORDER — PHENAZOPYRIDINE HYDROCHLORIDE 200 MG/1
200 TABLET, FILM COATED ORAL 3 TIMES DAILY PRN
Qty: 10 TABLET | Refills: 0 | Status: SHIPPED | OUTPATIENT
Start: 2025-08-19

## 2025-08-19 ASSESSMENT — LIFESTYLE VARIABLES
HOW MANY STANDARD DRINKS CONTAINING ALCOHOL DO YOU HAVE ON A TYPICAL DAY: PATIENT DECLINED
HOW OFTEN DO YOU HAVE SIX OR MORE DRINKS ON ONE OCCASION: PATIENT DECLINED
HOW OFTEN DO YOU HAVE A DRINK CONTAINING ALCOHOL: PATIENT DECLINED
AUDIT-C TOTAL SCORE: -1
SKIP TO QUESTIONS 9-10: 0

## 2025-08-19 ASSESSMENT — ENCOUNTER SYMPTOMS
NAUSEA: 0
FLANK PAIN: 0
NECK PAIN: 0
VOMITING: 0
BACK PAIN: 0
CHILLS: 0
FEVER: 0
ABDOMINAL PAIN: 0
HEARTBURN: 0
MYALGIAS: 0
DIZZINESS: 0

## 2025-08-19 ASSESSMENT — FIBROSIS 4 INDEX: FIB4 SCORE: 0.36

## 2025-08-20 DIAGNOSIS — R30.0 DYSURIA: ICD-10-CM

## 2025-08-20 DIAGNOSIS — N89.8 VAGINAL IRRITATION: ICD-10-CM

## 2025-08-20 DIAGNOSIS — R82.998 LEUKOCYTES IN URINE: ICD-10-CM

## 2025-08-20 DIAGNOSIS — N39.0 URINARY TRACT INFECTION WITHOUT HEMATURIA, SITE UNSPECIFIED: ICD-10-CM

## 2025-08-20 LAB
CANDIDA DNA VAG QL PROBE+SIG AMP: NEGATIVE
G VAGINALIS DNA VAG QL PROBE+SIG AMP: POSITIVE
T VAGINALIS DNA VAG QL PROBE+SIG AMP: NEGATIVE

## 2025-08-22 LAB
BACTERIA UR CULT: ABNORMAL
BACTERIA UR CULT: ABNORMAL
SIGNIFICANT IND 70042: ABNORMAL
SITE SITE: ABNORMAL
SOURCE SOURCE: ABNORMAL

## (undated) DEVICE — ENDOSTITCH LOAD UNIT 0 SURGI - 12/CA

## (undated) DEVICE — GLOVE SZ 7.5 BIOGEL PI MICRO - PF LF (50PR/BX)

## (undated) DEVICE — SET LEADWIRE 5 LEAD BEDSIDE DISPOSABLE ECG (1SET OF 5/EA)

## (undated) DEVICE — SUCTION INSTRUMENT YANKAUER BULBOUS TIP W/O VENT (50EA/CA)

## (undated) DEVICE — KIT ANESTHESIA W/CIRCUIT & 3/LT BAG W/FILTER (20EA/CA)

## (undated) DEVICE — HEAD HOLDER JUNIOR/ADULT

## (undated) DEVICE — TUBE E-T HI-LO CUFF 7.0MM (10EA/PK)

## (undated) DEVICE — LACTATED RINGERS INJ 1000 ML - (14EA/CA 60CA/PF)

## (undated) DEVICE — TUBING LAPAROSCOPIC PLUME DEVICE (10EA/CA)

## (undated) DEVICE — SUTURE 0 VICRYL PLUS CT-2 - 27 INCH (36/BX)

## (undated) DEVICE — SLEEVE, VASO, THIGH, MED

## (undated) DEVICE — GLOVE BIOGEL INDICATOR SZ 8 SURGICAL PF LTX - (50/BX 4BX/CA)

## (undated) DEVICE — TUBE NG SALEM SUMP 16FR (50EA/CA)

## (undated) DEVICE — SODIUM CHL IRRIGATION 0.9% 1000ML (12EA/CA)

## (undated) DEVICE — KIT ROOM DECONTAMINATION

## (undated) DEVICE — NEEDLE INSFL 120MM 14GA VRRS - (20/BX)

## (undated) DEVICE — CANNULA W/SEAL 5X100 Z-THRE - ADED KII (12/BX)

## (undated) DEVICE — NEPTUNE 4 PORT MANIFOLD - (20/PK)

## (undated) DEVICE — TROCAR Z THREAD11MM OPTICAL - NON BLADED(6/BX)

## (undated) DEVICE — ENDOSTITCH10MM SUTURING DEVIC - (3/CA)

## (undated) DEVICE — SPONGE GAUZESTER. 2X2 4-PL - (2/PK 50PK/BX 30BX/CS)

## (undated) DEVICE — MASK ANESTHESIA ADULT  - (100/CA)

## (undated) DEVICE — SUTURE 4-0 VICRYL PLUS FS-2 - 27 INCH (36/BX)

## (undated) DEVICE — SENSOR SPO2 NEO LNCS ADHESIVE (20/BX) SEE USER NOTES

## (undated) DEVICE — SEALER VESSEL HARMONIC ACE PLUS WITH ADVANCED HEMOSTASIS 36CM (1/EA)

## (undated) DEVICE — SET EXTENSION WITH 2 PORTS (48EA/CA) ***PART #2C8610 IS A SUBSTITUTE*****

## (undated) DEVICE — SUTURE GENERAL

## (undated) DEVICE — PROTECTOR ULNA NERVE - (36PR/CA)

## (undated) DEVICE — SET SUCTION/IRRIGATION WITH DISPOSABLE TIP (6/CA )PART #0250-070-520 IS A SUB

## (undated) DEVICE — CHLORAPREP 26 ML APPLICATOR - ORANGE TINT(25/CA)

## (undated) DEVICE — GLOVE BIOGEL SZ 7.5 SURGICAL PF LTX - (50PR/BX 4BX/CA)

## (undated) DEVICE — ELECTRODE DUAL RETURN W/ CORD - (50/PK)

## (undated) DEVICE — CANISTER SUCTION 3000ML MECHANICAL FILTER AUTO SHUTOFF MEDI-VAC NONSTERILE LF DISP  (40EA/CA)

## (undated) DEVICE — ELECTRODE 850 FOAM ADHESIVE - HYDROGEL RADIOTRNSPRNT (50/PK)

## (undated) DEVICE — TROCAR 5X100 NON BLADED Z-TH - READ KII (6/BX)

## (undated) DEVICE — DRESSING TRANSPARENT FILM TEGADERM 2.375 X 2.75"  (100EA/BX)"

## (undated) DEVICE — GLOVE BIOGEL M SZ 8 SURGICAL PF LTX - (50/BX 4BX/CA)

## (undated) DEVICE — TUBING CLEARLINK DUO-VENT - C-FLO (48EA/CA)

## (undated) DEVICE — GLOVE BIOGEL PI INDICATOR SZ 7.5 SURGICAL PF LF -(50/BX 4BX/CA)

## (undated) DEVICE — PACK LAP CHOLE OR - (2EA/CA)